# Patient Record
Sex: MALE | Race: WHITE | NOT HISPANIC OR LATINO | Employment: OTHER | URBAN - METROPOLITAN AREA
[De-identification: names, ages, dates, MRNs, and addresses within clinical notes are randomized per-mention and may not be internally consistent; named-entity substitution may affect disease eponyms.]

---

## 2023-09-02 PROBLEM — J31.0 CHRONIC RHINITIS: Status: ACTIVE | Noted: 2023-09-02

## 2023-09-02 PROBLEM — E87.8 FLUID VOLUME DISORDER: Status: ACTIVE | Noted: 2023-09-02

## 2023-09-02 PROBLEM — I10 ESSENTIAL HYPERTENSION: Status: ACTIVE | Noted: 2023-09-02

## 2023-09-02 PROBLEM — R29.898 WEAKNESS OF BOTH LOWER EXTREMITIES: Status: ACTIVE | Noted: 2023-09-02

## 2023-09-02 PROBLEM — G47.30 SLEEP APNEA: Status: ACTIVE | Noted: 2023-09-02

## 2023-09-02 PROBLEM — I73.9 PERIPHERAL VASCULAR DISEASE (CMS-HCC): Status: ACTIVE | Noted: 2023-09-02

## 2023-09-02 PROBLEM — M25.552 PAIN OF LEFT HIP JOINT: Status: ACTIVE | Noted: 2023-09-02

## 2023-09-02 PROBLEM — R10.32 LEFT INGUINAL PAIN: Status: ACTIVE | Noted: 2023-09-02

## 2023-09-02 PROBLEM — D69.6 THROMBOCYTOPENIA (CMS-HCC): Status: ACTIVE | Noted: 2023-09-02

## 2023-09-02 PROBLEM — F09 COGNITIVE DISORDER: Status: ACTIVE | Noted: 2023-09-02

## 2023-09-02 PROBLEM — E78.5 HYPERLIPIDEMIA: Status: ACTIVE | Noted: 2023-09-02

## 2023-09-02 PROBLEM — I65.23 BILATERAL CAROTID ARTERY STENOSIS: Status: ACTIVE | Noted: 2023-09-02

## 2023-09-02 PROBLEM — I25.10 CVD (CARDIOVASCULAR DISEASE): Status: ACTIVE | Noted: 2023-09-02

## 2023-09-02 PROBLEM — R00.1 BRADYCARDIA: Status: ACTIVE | Noted: 2023-09-02

## 2023-09-02 PROBLEM — I49.1 PREMATURE ATRIAL CONTRACTION: Status: ACTIVE | Noted: 2023-09-02

## 2023-09-02 PROBLEM — I82.90 VENOUS THROMBOSIS: Status: ACTIVE | Noted: 2023-09-02

## 2023-09-02 PROBLEM — G89.29 OTHER CHRONIC PAIN: Status: ACTIVE | Noted: 2023-09-02

## 2023-09-02 PROBLEM — I63.9 CEREBROVASCULAR ACCIDENT (MULTI): Status: ACTIVE | Noted: 2023-09-02

## 2023-09-02 PROBLEM — M25.551 PAIN IN RIGHT HIP: Status: ACTIVE | Noted: 2023-09-02

## 2023-09-02 PROBLEM — D69.1 PLATELET DISORDER (MULTI): Status: ACTIVE | Noted: 2023-09-02

## 2023-09-02 PROBLEM — R41.3 MEMORY IMPAIRMENT: Status: ACTIVE | Noted: 2023-09-02

## 2023-09-02 PROBLEM — K40.90 RIGHT INGUINAL HERNIA: Status: ACTIVE | Noted: 2023-09-02

## 2023-09-02 PROBLEM — H93.13 TINNITUS OF BOTH EARS: Status: ACTIVE | Noted: 2023-09-02

## 2023-09-02 PROBLEM — H90.3 SENSORINEURAL HEARING LOSS, BILATERAL: Status: ACTIVE | Noted: 2023-09-02

## 2023-09-02 RX ORDER — LISINOPRIL 5 MG/1
5 TABLET ORAL DAILY
COMMUNITY
Start: 2022-03-28 | End: 2024-02-13 | Stop reason: SINTOL

## 2023-09-02 RX ORDER — CHOLECALCIFEROL (VITAMIN D3) 25 MCG
25 TABLET ORAL DAILY
COMMUNITY

## 2023-09-02 RX ORDER — IPRATROPIUM BROMIDE 42 UG/1
2 SPRAY, METERED NASAL
COMMUNITY
Start: 2021-04-20 | End: 2024-06-06 | Stop reason: ALTCHOICE

## 2023-09-02 RX ORDER — SIMVASTATIN 80 MG/1
80 TABLET, FILM COATED ORAL DAILY
COMMUNITY
Start: 2023-04-21

## 2023-09-02 RX ORDER — HYDROCODONE BITARTRATE AND ACETAMINOPHEN 5; 325 MG/1; MG/1
TABLET ORAL
COMMUNITY

## 2023-09-02 RX ORDER — TRIAMCINOLONE ACETONIDE 1 MG/G
OINTMENT TOPICAL 2 TIMES DAILY
COMMUNITY

## 2023-09-02 RX ORDER — IPRATROPIUM BROMIDE 21 UG/1
SPRAY, METERED NASAL
COMMUNITY

## 2023-09-02 RX ORDER — METOPROLOL TARTRATE 25 MG/1
25 TABLET, FILM COATED ORAL 2 TIMES DAILY
COMMUNITY
Start: 2022-03-28 | End: 2024-04-24 | Stop reason: SINTOL

## 2023-09-02 RX ORDER — FLUTICASONE PROPIONATE 50 MCG
1 SPRAY, SUSPENSION (ML) NASAL DAILY
COMMUNITY
Start: 2020-10-16 | End: 2024-01-23 | Stop reason: ALTCHOICE

## 2023-11-13 ENCOUNTER — APPOINTMENT (OUTPATIENT)
Dept: AUDIOLOGY | Facility: CLINIC | Age: 88
End: 2023-11-13
Payer: MEDICARE

## 2023-11-16 ENCOUNTER — PHARMACY VISIT (OUTPATIENT)
Dept: PHARMACY | Facility: CLINIC | Age: 88
End: 2023-11-16
Payer: COMMERCIAL

## 2023-11-16 PROCEDURE — RXMED WILLOW AMBULATORY MEDICATION CHARGE

## 2023-11-27 ENCOUNTER — CLINICAL SUPPORT (OUTPATIENT)
Dept: AUDIOLOGY | Facility: CLINIC | Age: 88
End: 2023-11-27
Payer: MEDICARE

## 2023-11-27 DIAGNOSIS — H90.3 SENSORINEURAL HEARING LOSS (SNHL) OF BOTH EARS: Primary | ICD-10-CM

## 2023-11-27 PROCEDURE — HRANC PR HEARING AID NO CHARGE: Performed by: AUDIOLOGIST

## 2023-11-27 NOTE — PROGRESS NOTES
HEARING AID CHECK    RIGHT:  HEARING AID CHECK     TODAY : 5/8/23     RIGHT: PHONAK AUDEO B 90 R #2 LENGTH STANDARD  CLOSED MED DOME SN: [id number]  LEFT: PHONAK AUDEO B 90 R #2 LENGTH STANDARD  SMALL POWER DOME SN: [id number]  FIT DATE: 9/2018  WARRANTY: 12/21/21    This patient was seen for a HAC : . Otoscopy showed clear canals, and patient wearing aids 8 hours per day  Could not program as iCube  ll would not connect. Patient was fine as did not want changes.  Discussed new technology if needing new aids .    The following programming changes were made: none today    The patient paid $30.00 for today's visit.  Return in 6 months or sooner if needed.    APPOINTMENT TIME: 30

## 2023-12-07 ENCOUNTER — OFFICE VISIT (OUTPATIENT)
Dept: PRIMARY CARE | Facility: CLINIC | Age: 88
End: 2023-12-07
Payer: MEDICARE

## 2023-12-07 ENCOUNTER — APPOINTMENT (OUTPATIENT)
Dept: CARDIOLOGY | Facility: CLINIC | Age: 88
End: 2023-12-07
Payer: MEDICARE

## 2023-12-07 VITALS
TEMPERATURE: 98.7 F | HEART RATE: 90 BPM | HEIGHT: 68 IN | DIASTOLIC BLOOD PRESSURE: 76 MMHG | OXYGEN SATURATION: 96 % | SYSTOLIC BLOOD PRESSURE: 122 MMHG | BODY MASS INDEX: 28.95 KG/M2 | RESPIRATION RATE: 18 BRPM | WEIGHT: 191 LBS

## 2023-12-07 DIAGNOSIS — N39.0 URINARY TRACT INFECTION WITH HEMATURIA, SITE UNSPECIFIED: ICD-10-CM

## 2023-12-07 DIAGNOSIS — R30.0 DYSURIA: ICD-10-CM

## 2023-12-07 DIAGNOSIS — R31.9 URINARY TRACT INFECTION WITH HEMATURIA, SITE UNSPECIFIED: ICD-10-CM

## 2023-12-07 LAB
POC APPEARANCE, URINE: CLEAR
POC BILIRUBIN, URINE: NEGATIVE
POC BLOOD, URINE: ABNORMAL
POC COLOR, URINE: YELLOW
POC GLUCOSE, URINE: NEGATIVE MG/DL
POC KETONES, URINE: NEGATIVE MG/DL
POC LEUKOCYTES, URINE: ABNORMAL
POC NITRITE,URINE: NEGATIVE
POC PH, URINE: 6 PH
POC PROTEIN, URINE: ABNORMAL MG/DL
POC SPECIFIC GRAVITY, URINE: 1.02
POC UROBILINOGEN, URINE: 1 EU/DL

## 2023-12-07 PROCEDURE — 3078F DIAST BP <80 MM HG: CPT | Performed by: FAMILY MEDICINE

## 2023-12-07 PROCEDURE — 1159F MED LIST DOCD IN RCRD: CPT | Performed by: FAMILY MEDICINE

## 2023-12-07 PROCEDURE — 1036F TOBACCO NON-USER: CPT | Performed by: FAMILY MEDICINE

## 2023-12-07 PROCEDURE — 99213 OFFICE O/P EST LOW 20 MIN: CPT | Performed by: FAMILY MEDICINE

## 2023-12-07 PROCEDURE — 1126F AMNT PAIN NOTED NONE PRSNT: CPT | Performed by: FAMILY MEDICINE

## 2023-12-07 PROCEDURE — 87186 SC STD MICRODIL/AGAR DIL: CPT | Performed by: FAMILY MEDICINE

## 2023-12-07 PROCEDURE — 3074F SYST BP LT 130 MM HG: CPT | Performed by: FAMILY MEDICINE

## 2023-12-07 RX ORDER — METRONIDAZOLE 7.5 MG/G
GEL TOPICAL
COMMUNITY
Start: 2023-10-11

## 2023-12-07 RX ORDER — SULFAMETHOXAZOLE AND TRIMETHOPRIM 800; 160 MG/1; MG/1
1 TABLET ORAL 2 TIMES DAILY
Qty: 14 TABLET | Refills: 0 | Status: SHIPPED | OUTPATIENT
Start: 2023-12-07 | End: 2023-12-14

## 2023-12-07 ASSESSMENT — PATIENT HEALTH QUESTIONNAIRE - PHQ9
SUM OF ALL RESPONSES TO PHQ9 QUESTIONS 1 AND 2: 0
2. FEELING DOWN, DEPRESSED OR HOPELESS: NOT AT ALL
1. LITTLE INTEREST OR PLEASURE IN DOING THINGS: NOT AT ALL

## 2023-12-07 ASSESSMENT — PAIN SCALES - GENERAL: PAINLEVEL: 0-NO PAIN

## 2023-12-07 NOTE — PROGRESS NOTES
"Subjective   Patient ID: Jamie Caro is a 88 y.o. male who presents for UTI (Pt here for pain with urination x5 days).    UTI          Review of Systems    Objective   /76 (BP Location: Left arm, Patient Position: Sitting, BP Cuff Size: Adult)   Pulse 90   Temp 37.1 °C (98.7 °F) (Temporal)   Resp 18   Ht 1.727 m (5' 8\")   Wt 86.6 kg (191 lb)   SpO2 96%   BMI 29.04 kg/m²     Physical Exam  Constitutional:       General: He is not in acute distress.     Appearance: Normal appearance.   Cardiovascular:      Rate and Rhythm: Normal rate and regular rhythm.      Heart sounds: No murmur heard.  Pulmonary:      Breath sounds: Normal breath sounds. No wheezing.   Neurological:      Mental Status: He is alert.         Assessment/Plan   Problem List Items Addressed This Visit             ICD-10-CM    Dysuria R30.0    Urinary tract infection with hematuria N39.0, R31.9          "

## 2023-12-10 LAB — BACTERIA UR CULT: ABNORMAL

## 2023-12-12 ENCOUNTER — OFFICE VISIT (OUTPATIENT)
Dept: CARDIOLOGY | Facility: CLINIC | Age: 88
End: 2023-12-12
Payer: MEDICARE

## 2023-12-12 ENCOUNTER — LAB (OUTPATIENT)
Dept: LAB | Facility: LAB | Age: 88
End: 2023-12-12
Payer: MEDICARE

## 2023-12-12 VITALS — HEART RATE: 76 BPM

## 2023-12-12 DIAGNOSIS — I48.4 ATYPICAL ATRIAL FLUTTER (MULTI): ICD-10-CM

## 2023-12-12 LAB
ALBUMIN SERPL-MCNC: 4.3 G/DL (ref 3.5–5)
ALP BLD-CCNC: 118 U/L (ref 35–125)
ALT SERPL-CCNC: 147 U/L (ref 5–40)
ANION GAP SERPL CALC-SCNC: 14 MMOL/L
AST SERPL-CCNC: 102 U/L (ref 5–40)
BILIRUB SERPL-MCNC: 0.7 MG/DL (ref 0.1–1.2)
BUN SERPL-MCNC: 21 MG/DL (ref 8–25)
CALCIUM SERPL-MCNC: 9.6 MG/DL (ref 8.5–10.4)
CHLORIDE SERPL-SCNC: 110 MMOL/L (ref 97–107)
CO2 SERPL-SCNC: 24 MMOL/L (ref 24–31)
CREAT SERPL-MCNC: 1.1 MG/DL (ref 0.4–1.6)
ERYTHROCYTE [DISTWIDTH] IN BLOOD BY AUTOMATED COUNT: 13.1 % (ref 11.5–14.5)
GFR SERPL CREATININE-BSD FRML MDRD: 65 ML/MIN/1.73M*2
GLUCOSE SERPL-MCNC: 94 MG/DL (ref 65–99)
HCT VFR BLD AUTO: 42.1 % (ref 41–52)
HGB BLD-MCNC: 13.5 G/DL (ref 13.5–17.5)
MCH RBC QN AUTO: 31.9 PG (ref 26–34)
MCHC RBC AUTO-ENTMCNC: 32.1 G/DL (ref 32–36)
MCV RBC AUTO: 100 FL (ref 80–100)
NRBC BLD-RTO: 0 /100 WBCS (ref 0–0)
PLATELET # BLD AUTO: 206 X10*3/UL (ref 150–450)
POTASSIUM SERPL-SCNC: 5 MMOL/L (ref 3.4–5.1)
PROT SERPL-MCNC: 7 G/DL (ref 5.9–7.9)
RBC # BLD AUTO: 4.23 X10*6/UL (ref 4.5–5.9)
SODIUM SERPL-SCNC: 148 MMOL/L (ref 133–145)
WBC # BLD AUTO: 6.4 X10*3/UL (ref 4.4–11.3)

## 2023-12-12 PROCEDURE — 99213 OFFICE O/P EST LOW 20 MIN: CPT | Performed by: INTERNAL MEDICINE

## 2023-12-12 PROCEDURE — 1126F AMNT PAIN NOTED NONE PRSNT: CPT | Performed by: INTERNAL MEDICINE

## 2023-12-12 PROCEDURE — 36415 COLL VENOUS BLD VENIPUNCTURE: CPT

## 2023-12-12 PROCEDURE — 1036F TOBACCO NON-USER: CPT | Performed by: INTERNAL MEDICINE

## 2023-12-12 PROCEDURE — 1159F MED LIST DOCD IN RCRD: CPT | Performed by: INTERNAL MEDICINE

## 2023-12-12 PROCEDURE — 93005 ELECTROCARDIOGRAM TRACING: CPT | Performed by: INTERNAL MEDICINE

## 2023-12-12 PROCEDURE — 80053 COMPREHEN METABOLIC PANEL: CPT

## 2023-12-12 PROCEDURE — 85027 COMPLETE CBC AUTOMATED: CPT

## 2023-12-12 PROCEDURE — 93010 ELECTROCARDIOGRAM REPORT: CPT | Performed by: INTERNAL MEDICINE

## 2023-12-12 NOTE — PROGRESS NOTES
Chief Complaint   Patient presents with    Atrial Flutter            The patient is an 88-year-old male who is well-known to me.  He has a history of hypertension, vascular disease, and permanent atrial arrhythmias with really no significant symptoms associated with these at all.  He is maintained on anticoagulants and rate control and he has done well with this approach.         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 (CMS/[facility]) 09/02/2023    Bilateral carotid artery stenosis 09/02/2023    Bradycardia 09/02/2023    Cerebrovascular accident (CMS/[facility]) 09/02/2023    Chronic rhinitis 09/02/2023    Coronary arteriosclerosis 09/02/2023    Essential hypertension 09/02/2023    Fluid volume disorder 09/02/2023    Hyperlipidemia 09/02/2023    Left inguinal pain 09/02/2023    Right inguinal pain 09/02/2023    Lumbago with sciatica 09/02/2023    Cognitive disorder 09/02/2023    Memory impairment 09/02/2023    Other chronic pain 09/02/2023    CVD (cardiovascular disease) 09/02/2023    Peripheral vascular disease (CMS/[facility]) 09/02/2023    Pain in right hip 09/02/2023    Pain of left hip joint 09/02/2023    Premature atrial contraction 09/02/2023    Premature beats 09/02/2023    Right inguinal hernia 09/02/2023    Sensorineural hearing loss, 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    Sleep apnea 09/02/2023    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[facility]) 09/02/2023    Thrombocytopenia (CMS/[facility]) 09/02/2023    Tinnitus of both ears 09/02/2023    Typical atrial flutter (CMS/[facility]) 09/02/2023    Venous thrombosis 09/02/2023    Weakness of both lower extremities 09/02/2023    Dysuria 12/07/2023    Urinary tract infection with hematuria 12/07/2023     Resolved Ambulatory Problems     Diagnosis Date Noted    No Resolved Ambulatory Problems     Past Medical History:   Diagnosis Date    Atherosclerotic heart disease of native coronary artery without angina pectoris     Personal history of other diseases of the  circulatory system     Personal history of other malignant neoplasm of skin         Review of Systems   All other systems reviewed and are negative.       Objective     Vitals:    12/12/23 1219   Pulse: 76        Constitutional:       Appearance: Healthy appearance.   Pulmonary:      Effort: Pulmonary effort is normal.      Breath sounds: Normal breath sounds.   Cardiovascular:      PMI at left midclavicular line. Tachycardia present. Irregularly irregular rhythm.      Murmurs: There is a systolic murmur.      No gallop.  No click. No rub.   Pulses:     Intact distal pulses.   Abdominal:      General: Bowel sounds are normal.   Musculoskeletal:      Cervical back: Neck supple. Skin:     General: Skin is warm and dry.   Neurological:      General: No focal deficit present.            Lab Review:   Office Visit on 12/07/2023   Component Date Value    POC Color, Urine 12/07/2023 Yellow     POC Appearance, Urine 12/07/2023 Clear     POC Glucose, Urine 12/07/2023 NEGATIVE     POC Bilirubin, Urine 12/07/2023 NEGATIVE     POC Ketones, Urine 12/07/2023 NEGATIVE     POC Specific Gravity, Ur* 12/07/2023 1.020     POC Blood, Urine 12/07/2023 MODERATE (2+) (A)     POC PH, Urine 12/07/2023 6.0     POC Protein, Urine 12/07/2023 100 (2+) (A)     POC Urobilinogen, Urine 12/07/2023 1.0     Poc Nitrite, Urine 12/07/2023 NEGATIVE     POC Leukocytes, Urine 12/07/2023 MODERATE (2+) (A)     Urine Culture 12/07/2023 >100,000 Proteus mirabilis (A)        ECG:    Atypical atrial flutter with variable conduction left axis deviation right bundle branch block QRS duration 140 ms QTc 440 ms    Assessment/plan:  Continue current regimen:

## 2023-12-26 ENCOUNTER — APPOINTMENT (OUTPATIENT)
Dept: CARDIOLOGY | Facility: CLINIC | Age: 88
End: 2023-12-26
Payer: COMMERCIAL

## 2024-01-02 ENCOUNTER — EVALUATION (OUTPATIENT)
Dept: OCCUPATIONAL THERAPY | Facility: CLINIC | Age: 89
End: 2024-01-02
Payer: MEDICARE

## 2024-01-02 DIAGNOSIS — S63.614A UNSPECIFIED SPRAIN OF RIGHT RING FINGER, INITIAL ENCOUNTER: ICD-10-CM

## 2024-01-02 DIAGNOSIS — M79.641 PAIN IN RIGHT HAND: Primary | ICD-10-CM

## 2024-01-02 PROCEDURE — 97110 THERAPEUTIC EXERCISES: CPT | Mod: GO | Performed by: OCCUPATIONAL THERAPIST

## 2024-01-02 PROCEDURE — 97165 OT EVAL LOW COMPLEX 30 MIN: CPT | Mod: GO | Performed by: OCCUPATIONAL THERAPIST

## 2024-01-02 ASSESSMENT — PAIN - FUNCTIONAL ASSESSMENT: PAIN_FUNCTIONAL_ASSESSMENT: 0-10

## 2024-01-02 ASSESSMENT — ENCOUNTER SYMPTOMS
PAIN SCALE AT HIGHEST: 7
PAIN SCALE: 0
PAIN SCALE AT LOWEST: 0
QUALITY: TIGHT

## 2024-01-02 ASSESSMENT — PAIN SCALES - GENERAL: PAINLEVEL_OUTOF10: 7

## 2024-01-09 ENCOUNTER — TREATMENT (OUTPATIENT)
Dept: OCCUPATIONAL THERAPY | Facility: CLINIC | Age: 89
End: 2024-01-09
Payer: MEDICARE

## 2024-01-09 DIAGNOSIS — S63.614A UNSPECIFIED SPRAIN OF RIGHT RING FINGER, INITIAL ENCOUNTER: ICD-10-CM

## 2024-01-09 PROCEDURE — 97110 THERAPEUTIC EXERCISES: CPT | Mod: GO,CO

## 2024-01-09 PROCEDURE — 97022 WHIRLPOOL THERAPY: CPT | Mod: GO,CO

## 2024-01-09 ASSESSMENT — PAIN - FUNCTIONAL ASSESSMENT: PAIN_FUNCTIONAL_ASSESSMENT: 0-10

## 2024-01-09 ASSESSMENT — PAIN SCALES - GENERAL: PAINLEVEL_OUTOF10: 6

## 2024-01-09 NOTE — PROGRESS NOTES
"Occupational Therapy Treatment    Patient Name: Jamie Caro  MRN: [id number]  Today's Date: 1/9/2024    Time Calculation  Start Time: 1040  Stop Time: 1125  Time Calculation (min): 45 min  OT Modalities Time Entry  Whirlpool Time Entry: 10  OT Therapeutic Procedures Time Entry  Manual Therapy Time Entry: 10  Therapeutic Exercise Time Entry: 25  Insurance:  Visit number: 2 of 6  Insurance Type: Medicare part A and B    Subjective   Current Problem/Diagnosis:  1. Pain in right hand          2. Unspecified sprain of right ring finger, initial encounter  Referral to Occupational Therapy     Follow Up In Occupational Therapy               History of Present Illness  Date of onset: 5/4/2023  Mechanism of injury: Patient reports he sustained a fall on 5/4/23 while walking his dog outside; the leash was wrapped around his R hand at the time of the fall. Patient reports onset of edema in R LF and RF and dorsal/volar aspects of hand; shortly after patient began noticing difficulty extending and flexing digits, which has worsened since onset bringing patient to clinic at this time.  Referred by: Maryellen, follow up in Mid Feb    Patient reports \" my fingers are stiff and just wont straighten out\". Pt not currently using modalities, educated pt in importance of heat before and ice after HEP to achieve most outcome. Goals reviewed with pt, he verbalized understanding.     Performing HEP?: Yes    Pain:  Pain Assessment  Pain Assessment: 0-10  Pain Score: 6  Pain Type: Acute pain  Pain Location: Finger (Comment which one)  Pain Orientation: Right (ring finger)  Pain Interventions:  (pt not currently using any modalities or meds)    Objective Min/trace edema noted throughout R RF, flexion contracture in R RF PIP and slightly in R SF  PIP.   Sensory: intact  Numbness/Tingling: none noted today    UEFI completed by pt today at 74/80    AROM measured today , the changes noted are   R IF DIP flexion at 60(was 45)  R LF DIP flexion " at 45 (was 30)  R RF PIP ext at -35( was -55)  R RF DIP flexion at 40 ( was 25)  R SF PIP flexion at 68( was 45)    Treatment:    Modalities:   AROM /tendon glides while in fluido to promote muscle movement during session x 10 min    Therapeutic Exercise:    PROM performed by writer of all joints in R RF and R SF, good tolerance by pt.   Educated pt in place and hold exercises with focus on PIP fleixon and ext.  Pt completed 15 reps(up from 10) of tendon glides  Educated pt to add to HEP curls around pencil, completed 5 reps with 5 sec hold.     Post-tx pain:0/10, unchanged since start of session.   Pt tolerated added exercises well. Pt to increase reps of tendon glides and focus on wearing digit sleeves and use of modalities to decrease edema. Noted increased AROM in many areas of R hand. Pt highly motivated to return to function.     OP EDUCATION:  Education  Individual(s) Educated: Patient  Education Provided: POC discussed and agreed upon, Orthotics and/or prosthetic trainging, Edema control  Home Program: PROM, AROM, Edema control, Orthotic wearing schedule, care and precautions  Patient/Caregiver Demonstrated Understanding: yes  Patient Response to Education: Patient/Caregiver Verbalized Understanding of Information, Patient/Caregiver Performed Return Demonstration of Exercises/Activities, Patient/Caregiver Asked Appropriate Questions    Goals:  Active       OT Goals       1. Patient will increase AROM R hand all digits to at least 220 degrees to increase functional use and ease of gripping with R hand. (Progressing)       Start:  01/02/24    Expected End:  01/16/24            2. Patient will increase PIP extension of R LF and RF by at least 15 degrees to increase functional use of R hand. (Progressing)       Start:  01/02/24    Expected End:  01/16/24            3. Patient will increase R hand  strength by at least 10# to increase functional use of dominant hand with daily activities/hobbies.  (Progressing)       Start:  01/02/24    Expected End:  02/13/24

## 2024-01-10 ENCOUNTER — PHARMACY VISIT (OUTPATIENT)
Dept: PHARMACY | Facility: CLINIC | Age: 89
End: 2024-01-10
Payer: COMMERCIAL

## 2024-01-10 PROCEDURE — RXMED WILLOW AMBULATORY MEDICATION CHARGE

## 2024-01-16 ENCOUNTER — TREATMENT (OUTPATIENT)
Dept: OCCUPATIONAL THERAPY | Facility: CLINIC | Age: 89
End: 2024-01-16
Payer: MEDICARE

## 2024-01-16 DIAGNOSIS — S63.614A UNSPECIFIED SPRAIN OF RIGHT RING FINGER, INITIAL ENCOUNTER: ICD-10-CM

## 2024-01-16 PROCEDURE — 97110 THERAPEUTIC EXERCISES: CPT | Mod: GO | Performed by: OCCUPATIONAL THERAPIST

## 2024-01-16 ASSESSMENT — PAIN - FUNCTIONAL ASSESSMENT: PAIN_FUNCTIONAL_ASSESSMENT: 0-10

## 2024-01-16 ASSESSMENT — PAIN SCALES - GENERAL: PAINLEVEL_OUTOF10: 0 - NO PAIN

## 2024-01-16 NOTE — PROGRESS NOTES
"Occupational Therapy Treatment    Patient Name: Jamie Caro  MRN: [id number]  Today's Date: 1/16/2024    Time Calculation  Start Time: 1015  Stop Time: 1105  Time Calculation (min): 50 min  OT Modalities Time Entry  Whirlpool Time Entry: 10  OT Therapeutic Procedures Time Entry  Therapeutic Exercise Time Entry: 40    Insurance:  Visit number: 3 of 6 (anticipated)  Insurance Type: Medicare    Subjective   Current Problem/Reason for visit:  Patient reports he sustained a fall on 5/4/23 while walking his dog outside; the leash was wrapped around his R hand at the time of the fall. Patient reports onset of edema in R LF and RF and dorsal/volar aspects of hand; shortly after patient began noticing difficulty extending and flexing digits, which has worsened since onset bringing patient to clinic at this time.  Referred by: Maryellen,    Patient reports \"It's a little better.\"    Performing HEP?: Yes    Pain:  Pain Assessment  Pain Assessment: 0-10  Pain Score: 0 - No pain    Objective     Right AROM  INDEX LONG RING SMALL    MCP Extension/Flexion 0/75 0/80 0/85 0/85    PIP Extension/Flexion 0/90 -30/90 -35/90 -20/90    DIP Extension/Flexion 0/50 0/50 0/40 0/60   FERNANDEZ  215 (+5) 190 (+40) 180  (+35) 215  (+15)       Physical Observation: +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    Therapeutic Exercise:  Therapist provided demonstration with verbal instruction for DIP blocking ther ex for R LF and RF; patient performed with hands-on assist x5 reps with 5 second hold; good return demo x10 reps; written handout issued; added to HEP.  Therapist provided demonstration with verbal instruction for Reverse blocking ther ex for R LF and RF; patient performed with hands-on assist x5 reps with 5 second hold; good return " demo x10 reps; written handout issued; added to HEP.  Thenar and hypothenar strengthening with two band resistance and isometric composite and lumbrical gripping; 10 reps each; written handout issued; added to HEP.      Post-tx pain: 0/10    Assessment/Plan Patient with improving AROM noted in all digits this date; good tolerance for ther ex; requires re-instruction to ensure accurate follow through; high motivation throughout tx sx. Will continue advance intervention as indicated/tolerated.        OP EDUCATION:  Education  Individual(s) Educated: Patient  Home Program: PROM, AROM, Tendon gliding, Strengthening, Modalities, Edema control    Goals:  Active       OT Goals       1. Patient will increase AROM R hand all digits to at least 220 degrees to increase functional use and ease of gripping with R hand. (Progressing)       Start:  01/02/24    Expected End:  01/16/24            2. Patient will increase PIP extension of R LF and RF by at least 15 degrees to increase functional use of R hand. (Progressing)       Start:  01/02/24    Expected End:  01/16/24            3. Patient will increase R hand  strength by at least 10# to increase functional use of dominant hand with daily activities/hobbies. (Progressing)       Start:  01/02/24    Expected End:  02/13/24

## 2024-01-23 ENCOUNTER — TREATMENT (OUTPATIENT)
Dept: OCCUPATIONAL THERAPY | Facility: CLINIC | Age: 89
End: 2024-01-23
Payer: MEDICARE

## 2024-01-23 ENCOUNTER — HOSPITAL ENCOUNTER (OUTPATIENT)
Dept: RADIOLOGY | Facility: CLINIC | Age: 89
Discharge: HOME | End: 2024-01-23
Payer: MEDICARE

## 2024-01-23 ENCOUNTER — OFFICE VISIT (OUTPATIENT)
Dept: PRIMARY CARE | Facility: CLINIC | Age: 89
End: 2024-01-23
Payer: MEDICARE

## 2024-01-23 ENCOUNTER — TELEPHONE (OUTPATIENT)
Dept: OTOLARYNGOLOGY | Facility: CLINIC | Age: 89
End: 2024-01-23

## 2024-01-23 VITALS
OXYGEN SATURATION: 99 % | DIASTOLIC BLOOD PRESSURE: 60 MMHG | HEART RATE: 51 BPM | SYSTOLIC BLOOD PRESSURE: 140 MMHG | RESPIRATION RATE: 17 BRPM

## 2024-01-23 DIAGNOSIS — R06.02 SHORTNESS OF BREATH: ICD-10-CM

## 2024-01-23 DIAGNOSIS — J31.0 CHRONIC RHINITIS: Primary | ICD-10-CM

## 2024-01-23 DIAGNOSIS — R06.02 SHORTNESS OF BREATH: Primary | ICD-10-CM

## 2024-01-23 DIAGNOSIS — S63.614A UNSPECIFIED SPRAIN OF RIGHT RING FINGER, INITIAL ENCOUNTER: ICD-10-CM

## 2024-01-23 LAB — BNP SERPL-MCNC: 480 PG/ML (ref 0–99)

## 2024-01-23 PROCEDURE — 83880 ASSAY OF NATRIURETIC PEPTIDE: CPT | Mod: WESLAB | Performed by: NURSE PRACTITIONER

## 2024-01-23 PROCEDURE — 99213 OFFICE O/P EST LOW 20 MIN: CPT | Performed by: NURSE PRACTITIONER

## 2024-01-23 PROCEDURE — 1126F AMNT PAIN NOTED NONE PRSNT: CPT | Performed by: NURSE PRACTITIONER

## 2024-01-23 PROCEDURE — 36415 COLL VENOUS BLD VENIPUNCTURE: CPT | Performed by: NURSE PRACTITIONER

## 2024-01-23 PROCEDURE — 71046 X-RAY EXAM CHEST 2 VIEWS: CPT

## 2024-01-23 PROCEDURE — 1123F ACP DISCUSS/DSCN MKR DOCD: CPT | Performed by: NURSE PRACTITIONER

## 2024-01-23 PROCEDURE — 1158F ADVNC CARE PLAN TLK DOCD: CPT | Performed by: NURSE PRACTITIONER

## 2024-01-23 PROCEDURE — 3078F DIAST BP <80 MM HG: CPT | Performed by: NURSE PRACTITIONER

## 2024-01-23 PROCEDURE — 97110 THERAPEUTIC EXERCISES: CPT | Mod: GO | Performed by: OCCUPATIONAL THERAPIST

## 2024-01-23 PROCEDURE — 1157F ADVNC CARE PLAN IN RCRD: CPT | Performed by: NURSE PRACTITIONER

## 2024-01-23 PROCEDURE — 97022 WHIRLPOOL THERAPY: CPT | Mod: GO | Performed by: OCCUPATIONAL THERAPIST

## 2024-01-23 PROCEDURE — 3077F SYST BP >= 140 MM HG: CPT | Performed by: NURSE PRACTITIONER

## 2024-01-23 PROCEDURE — 1159F MED LIST DOCD IN RCRD: CPT | Performed by: NURSE PRACTITIONER

## 2024-01-23 PROCEDURE — 1036F TOBACCO NON-USER: CPT | Performed by: NURSE PRACTITIONER

## 2024-01-23 RX ORDER — FLUTICASONE PROPIONATE 50 MCG
SPRAY, SUSPENSION (ML) NASAL
Qty: 16 G | Refills: 11 | Status: SHIPPED | OUTPATIENT
Start: 2024-01-23

## 2024-01-23 RX ORDER — FUROSEMIDE 20 MG/1
20 TABLET ORAL DAILY
Qty: 30 TABLET | Refills: 0 | Status: SHIPPED | OUTPATIENT
Start: 2024-01-23 | End: 2024-01-30 | Stop reason: SDUPTHER

## 2024-01-23 ASSESSMENT — PAIN SCALES - GENERAL
PAINLEVEL: 0-NO PAIN
PAINLEVEL_OUTOF10: 0 - NO PAIN

## 2024-01-23 ASSESSMENT — PATIENT HEALTH QUESTIONNAIRE - PHQ9
SUM OF ALL RESPONSES TO PHQ9 QUESTIONS 1 AND 2: 0
1. LITTLE INTEREST OR PLEASURE IN DOING THINGS: NOT AT ALL
2. FEELING DOWN, DEPRESSED OR HOPELESS: NOT AT ALL

## 2024-01-23 ASSESSMENT — PAIN - FUNCTIONAL ASSESSMENT: PAIN_FUNCTIONAL_ASSESSMENT: 0-10

## 2024-01-23 NOTE — PROGRESS NOTES
Subjective   Patient ID: Jamie Caro is a 89 y.o. male who presents for Shortness of Breath (Patient here for SOB, saw Dr. Davey a few weeks ago and everything was okay).Had virus about month ago and had cough and was worsening SOB weatther temp causing increase phyelm bending over to lesh dog and take breath away no     HPI     Review of Systems    Objective   /60 (BP Location: Left arm, Patient Position: Sitting, BP Cuff Size: Adult)   Pulse 51   Resp 17   SpO2 99%     Physical Exam  Vitals reviewed.   Constitutional:       Appearance: Normal appearance.   Cardiovascular:      Rate and Rhythm: Normal rate.      Pulses: Normal pulses.      Heart sounds: Normal heart sounds.   Pulmonary:      Effort: Pulmonary effort is normal.      Breath sounds: Normal breath sounds.   Musculoskeletal:      Cervical back: Normal range of motion.   Skin:     General: Skin is warm.   Neurological:      Mental Status: He is alert.         Assessment/Plan   Problem List Items Addressed This Visit    None  Visit Diagnoses         Codes    Shortness of breath    -  Primary R06.02    Relevant Orders    XR chest 2 views (Completed)    B-type natriuretic peptide          Discussed xray with Dr Martinez treatment for pleural effusion. Recheck 3 days

## 2024-01-23 NOTE — PROGRESS NOTES
"Occupational Therapy Treatment    Patient Name: Jamie Caro  MRN: [id number]  Today's Date: 1/23/2024    Time Calculation  Start Time: 1100  Stop Time: 1145  Time Calculation (min): 45 min  OT Modalities Time Entry  Whirlpool Time Entry: 10  OT Therapeutic Procedures Time Entry  Therapeutic Exercise Time Entry: 35    Insurance:  Visit number: 4 of 6 (anticipated)  Insurance Type: Medicare    Subjective   Current Problem/Reason for visit:  Patient reports he sustained a fall on 5/4/23 while walking his dog outside; the leash was wrapped around his R hand at the time of the fall. Patient reports onset of edema in R LF and RF and dorsal/volar aspects of hand; shortly after patient began noticing difficulty extending and flexing digits, which has worsened since onset bringing patient to clinic at this time.    Referred by: Maryellen    Patient reports \"I forgot about my exercises this past week. I have to start making up for it.\"    Performing HEP?: Partially; reports performing AROM/PROM ther ex, however, \"forgot\" about ther ex added at last tx sx.    Pain:  Pain Assessment  Pain Assessment: 0-10  Pain Score: 0 - No pain    Objective     Right AROM  INDEX LONG RING SMALL    MCP Extension/Flexion 0/85 0/85 0/85 0/85    PIP Extension/Flexion 0/90 -35/95 -45/100 -20/100    DIP Extension/Flexion 0/55 0/45 0/40 0/60   FERNANDEZ  230 (+15) 190 (NC) 180  (NC) 220  (+5)       Hand Strength   (lbs) Right Left   1     2 68# (+7) 74# (-6)   3     4     5         Physical Observation: +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    Therapeutic Exercise:  Therapist implemented PROM to R hand IF-->SF for composite and claw flexion, as well as, composite extension x10 reps each; 10 second hold at end range; patient reports " increased pain in digits with PROM ther ex.  Therapist reviewed thenar/hypothenar and isometric ther ex for previous tx sx to ensure understanding and promote follow through; 10 reps of digital abduction/adduction with two band resistance and composite and lumbrical gripping; good return demo.  Therapist provided demonstration with verbal instruction for hand strengthening with extra soft theraputty integrating composite gripping, 2-pt and 3-pt pinching, and digital extension; 10 reps each; written handout issued; added to HEP.    Post-tx pain: 0/10    Assessment/Plan Improvement noted in R hand  strength and some improved in AROM from last tx sx; will continue to progress to increase  strength and tight gripping abilities for increased functional use of R hand.        OP EDUCATION:  Education  Individual(s) Educated: Patient  Home Program: PROM, AROM, Strengthening, Modalities, Handout issued    Goals:  Active       OT Goals       1. Patient will increase AROM R hand all digits to at least 220 degrees to increase functional use and ease of gripping with R hand. (Progressing)       Start:  01/02/24    Expected End:  01/16/24            2. Patient will increase PIP extension of R LF and RF by at least 15 degrees to increase functional use of R hand. (Progressing)       Start:  01/02/24    Expected End:  01/16/24            3. Patient will increase R hand  strength by at least 10# to increase functional use of dominant hand with daily activities/hobbies. (Progressing)       Start:  01/02/24    Expected End:  02/13/24

## 2024-01-30 ENCOUNTER — HOSPITAL ENCOUNTER (OUTPATIENT)
Dept: RADIOLOGY | Facility: CLINIC | Age: 89
Discharge: HOME | End: 2024-01-30
Payer: MEDICARE

## 2024-01-30 ENCOUNTER — OFFICE VISIT (OUTPATIENT)
Dept: PRIMARY CARE | Facility: CLINIC | Age: 89
End: 2024-01-30
Payer: MEDICARE

## 2024-01-30 ENCOUNTER — TREATMENT (OUTPATIENT)
Dept: OCCUPATIONAL THERAPY | Facility: CLINIC | Age: 89
End: 2024-01-30
Payer: MEDICARE

## 2024-01-30 ENCOUNTER — APPOINTMENT (OUTPATIENT)
Dept: OCCUPATIONAL THERAPY | Facility: CLINIC | Age: 89
End: 2024-01-30
Payer: MEDICARE

## 2024-01-30 VITALS
RESPIRATION RATE: 17 BRPM | OXYGEN SATURATION: 98 % | SYSTOLIC BLOOD PRESSURE: 142 MMHG | DIASTOLIC BLOOD PRESSURE: 60 MMHG | HEART RATE: 82 BPM

## 2024-01-30 DIAGNOSIS — I50.9 ACUTE CONGESTIVE HEART FAILURE, UNSPECIFIED HEART FAILURE TYPE (MULTI): Primary | ICD-10-CM

## 2024-01-30 DIAGNOSIS — S63.614A UNSPECIFIED SPRAIN OF RIGHT RING FINGER, INITIAL ENCOUNTER: ICD-10-CM

## 2024-01-30 DIAGNOSIS — R05.1 ACUTE COUGH: ICD-10-CM

## 2024-01-30 DIAGNOSIS — R06.02 SHORTNESS OF BREATH: ICD-10-CM

## 2024-01-30 DIAGNOSIS — I50.9 ACUTE CONGESTIVE HEART FAILURE, UNSPECIFIED HEART FAILURE TYPE (MULTI): ICD-10-CM

## 2024-01-30 PROCEDURE — 97022 WHIRLPOOL THERAPY: CPT | Mod: GO | Performed by: OCCUPATIONAL THERAPIST

## 2024-01-30 PROCEDURE — L3933 FO W/O JOINTS CF: HCPCS | Performed by: OCCUPATIONAL THERAPIST

## 2024-01-30 PROCEDURE — 99213 OFFICE O/P EST LOW 20 MIN: CPT | Performed by: NURSE PRACTITIONER

## 2024-01-30 PROCEDURE — 71046 X-RAY EXAM CHEST 2 VIEWS: CPT

## 2024-01-30 PROCEDURE — 3077F SYST BP >= 140 MM HG: CPT | Performed by: NURSE PRACTITIONER

## 2024-01-30 PROCEDURE — 1126F AMNT PAIN NOTED NONE PRSNT: CPT | Performed by: NURSE PRACTITIONER

## 2024-01-30 PROCEDURE — 3078F DIAST BP <80 MM HG: CPT | Performed by: NURSE PRACTITIONER

## 2024-01-30 PROCEDURE — 97110 THERAPEUTIC EXERCISES: CPT | Mod: GO | Performed by: OCCUPATIONAL THERAPIST

## 2024-01-30 PROCEDURE — 1159F MED LIST DOCD IN RCRD: CPT | Performed by: NURSE PRACTITIONER

## 2024-01-30 PROCEDURE — 1036F TOBACCO NON-USER: CPT | Performed by: NURSE PRACTITIONER

## 2024-01-30 PROCEDURE — 1157F ADVNC CARE PLAN IN RCRD: CPT | Performed by: NURSE PRACTITIONER

## 2024-01-30 RX ORDER — FUROSEMIDE 20 MG/1
20 TABLET ORAL DAILY
Qty: 30 TABLET | Refills: 0 | Status: SHIPPED | OUTPATIENT
Start: 2024-01-30 | End: 2024-02-13 | Stop reason: SDUPTHER

## 2024-01-30 RX ORDER — BENZONATATE 100 MG/1
100 CAPSULE ORAL 3 TIMES DAILY PRN
Qty: 42 CAPSULE | Refills: 0 | Status: SHIPPED | OUTPATIENT
Start: 2024-01-30 | End: 2024-02-29

## 2024-01-30 RX ORDER — DOXYCYCLINE 100 MG/1
100 CAPSULE ORAL 2 TIMES DAILY
Qty: 20 CAPSULE | Refills: 0 | Status: SHIPPED | OUTPATIENT
Start: 2024-01-30 | End: 2024-02-09

## 2024-01-30 ASSESSMENT — PATIENT HEALTH QUESTIONNAIRE - PHQ9
1. LITTLE INTEREST OR PLEASURE IN DOING THINGS: NOT AT ALL
SUM OF ALL RESPONSES TO PHQ9 QUESTIONS 1 AND 2: 0
2. FEELING DOWN, DEPRESSED OR HOPELESS: NOT AT ALL

## 2024-01-30 ASSESSMENT — PAIN SCALES - GENERAL
PAINLEVEL: 0-NO PAIN
PAINLEVEL_OUTOF10: 0 - NO PAIN

## 2024-01-30 ASSESSMENT — ENCOUNTER SYMPTOMS: SHORTNESS OF BREATH: 1

## 2024-01-30 ASSESSMENT — PAIN - FUNCTIONAL ASSESSMENT: PAIN_FUNCTIONAL_ASSESSMENT: 0-10

## 2024-01-30 NOTE — PROGRESS NOTES
"Occupational Therapy Treatment    Patient Name: Jamie Caro  MRN: [id number]  Today's Date: 1/31/2024    Time Calculation  Start Time: 1350  Stop Time: 1440  Time Calculation (min): 50 min  OT Modalities Time Entry  Whirlpool Time Entry: 10  OT Therapeutic Procedures Time Entry  Therapeutic Exercise Time Entry: 30  Splinting Time Entry: 10  Codes  (x2)    Insurance:  Visit number: 5 of 6 (anticipated)  Insurance Type: Medicare    Subjective   Current Problem/Reason for visit:  Patient reports he sustained a fall on 5/4/23 while walking his dog outside; the leash was wrapped around his R hand at the time of the fall. Patient reports onset of edema in R LF and RF and dorsal/volar aspects of hand; shortly after patient began noticing difficulty extending and flexing digits, which has worsened since onset bringing patient to clinic at this time.    Referred by: Maryellen    Patient reports \"I haven't been able to do as much as I'd like to do of the exercises.\"    Performing HEP?: Partially    Pain:  Pain Assessment  Pain Assessment: 0-10  Pain Score: 0 - No pain    Objective     Physical Observation: +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    Therapeutic Exercise:  Therapist implemented PROM to R hand IF-->SF for composite and claw flexion, as well as, composite extension x10 reps each; 10 second hold at end range; patient reports increased pain in digits with PROM ther ex.  Reviewed HEP; primarily theraputty HEP; provided demonstration with verbal instruction for all ther ex; instructed patient in individual digital flexion and extension ther ex with theraputty; 10 reps; added to HEP.    Splinting:  Therapist fabricated custom digit extension splints to reduce PIP flexion of R LF and RF; instructed " patient in don/doff tech, proper fit, wear recommendations, importance of skin monitoring, and splint care; patient verbalized understanding for all education.       Post-tx pain: 0/10    Assessment/Plan Patient demonstrates good tolerance for all intervention this date; requested focus on extension of digits as patient is please with current ability to flex digits with gripping. Will trial splinting to improve extension and adjust as needed; will continue to progress intervention as tolerated.      OP EDUCATION:  Education  Individual(s) Educated: Patient  Home Program: PROM, AROM, Orthotic wearing schedule, care and precautions, Modalities, Strengthening, Edema control, Fine motor tasks    Goals:  Active       OT Goals       1. Patient will increase AROM R hand all digits to at least 220 degrees to increase functional use and ease of gripping with R hand. (Progressing)       Start:  01/02/24    Expected End:  01/16/24            2. Patient will increase PIP extension of R LF and RF by at least 15 degrees to increase functional use of R hand. (Progressing)       Start:  01/02/24    Expected End:  01/16/24            3. Patient will increase R hand  strength by at least 10# to increase functional use of dominant hand with daily activities/hobbies. (Progressing)       Start:  01/02/24    Expected End:  02/13/24

## 2024-01-30 NOTE — PROGRESS NOTES
Subjective   Patient ID: Jamie Caro is a 89 y.o. male who presents for Follow-up (Patient here for follow up for cough, and possibly chest xray).  Taking lasix noticed improvement with breathing cough slight worse at night. Has appt April with Dr Gavin. Will change referral and add echo  HPI     Review of Systems   Respiratory:  Positive for shortness of breath.    All other systems reviewed and are negative.      Objective   There were no vitals taken for this visit.    Physical Exam    Assessment/Plan   Problem List Items Addressed This Visit    None  Visit Diagnoses         Codes    Acute congestive heart failure, unspecified heart failure type (CMS/[facility])    -  Primary I50.9    Relevant Orders    XR chest 2 views    Echocardiogram - Onbase Scan    Referral to Cardiology    Shortness of breath     R06.02    Relevant Medications    furosemide (Lasix) 20 mg tablet    Other Relevant Orders    XR chest 2 views    Echocardiogram - Onbase Scan    Referral to Cardiology    Acute cough     R05.1    Relevant Medications    benzonatate (Tessalon) 100 mg capsule    Other Relevant Orders    XR chest 2 views

## 2024-02-06 ENCOUNTER — APPOINTMENT (OUTPATIENT)
Dept: OCCUPATIONAL THERAPY | Facility: CLINIC | Age: 89
End: 2024-02-06
Payer: MEDICARE

## 2024-02-06 ENCOUNTER — TREATMENT (OUTPATIENT)
Dept: OCCUPATIONAL THERAPY | Facility: CLINIC | Age: 89
End: 2024-02-06
Payer: MEDICARE

## 2024-02-06 DIAGNOSIS — S63.614A UNSPECIFIED SPRAIN OF RIGHT RING FINGER, INITIAL ENCOUNTER: ICD-10-CM

## 2024-02-06 PROCEDURE — 97110 THERAPEUTIC EXERCISES: CPT | Mod: GO | Performed by: OCCUPATIONAL THERAPIST

## 2024-02-06 ASSESSMENT — PAIN SCALES - GENERAL: PAINLEVEL_OUTOF10: 0 - NO PAIN

## 2024-02-06 ASSESSMENT — PAIN - FUNCTIONAL ASSESSMENT: PAIN_FUNCTIONAL_ASSESSMENT: 0-10

## 2024-02-06 NOTE — PROGRESS NOTES
"Occupational Therapy Treatment    Patient Name: Jamie Caro  MRN: [id number]  Today's Date: 2/6/2024    Time Calculation  Start Time: 1100  Stop Time: 1148  Time Calculation (min): 48 min  OT Modalities Time Entry  Whirlpool Time Entry: 10  OT Therapeutic Procedures Time Entry  Therapeutic Exercise Time Entry: 38    Insurance:  Visit number: 6 of 6 (anticipated)  Insurance Type: Medicare    Subjective   Current Problem/Reason for visit:  Patient reports he sustained a fall on 5/4/23 while walking his dog outside; the leash was wrapped around his R hand at the time of the fall. Patient reports onset of edema in R LF and RF and dorsal/volar aspects of hand; shortly after patient began noticing difficulty extending and flexing digits, which has worsened since onset bringing patient to clinic at this time.    Referred by: Maryellen    Patient reports \"I had shortness of breath all last week so I really didn't get to do as much exercise as I  had hoped.\"    Performing HEP?: Partially; patient reports wearing digit extension splints for approximately 5 hours total since provided.    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    Therapeutic Exercise:  Therapist educated patient on importance of prioritizing HEP with daily activity and maintaining consistency; patient verbalized understanding.  Minor adjustments made to splints to increase comfort and   Therapist educated patient on purpose of/benefits of BTE machine for functional strengthening; tools used this date include:  Gripper (resist 85)  Lg knob CW/CCW (resist 9)  MD SD FW/BW (resist 16)  All tools x 2 charts    Post-tx pain:    Assessment/Plan " Patient demonstrates good tolerance for all intervention this date; receptive to education; will complete reassessment at next follow-up.      OP EDUCATION:  Education  Individual(s) Educated: Patient  Home Program: PROM, AROM, Fine motor tasks, Edema control, Modalities, Orthotic wearing schedule, care and precautions, Strengthening, Tendon gliding, Handout issued    Goals:  Active       OT Goals       1. Patient will increase AROM R hand all digits to at least 220 degrees to increase functional use and ease of gripping with R hand. (Progressing)       Start:  01/02/24    Expected End:  01/16/24            2. Patient will increase PIP extension of R LF and RF by at least 15 degrees to increase functional use of R hand. (Progressing)       Start:  01/02/24    Expected End:  01/16/24            3. Patient will increase R hand  strength by at least 10# to increase functional use of dominant hand with daily activities/hobbies. (Progressing)       Start:  01/02/24    Expected End:  02/13/24

## 2024-02-13 ENCOUNTER — OFFICE VISIT (OUTPATIENT)
Dept: PRIMARY CARE | Facility: CLINIC | Age: 89
End: 2024-02-13
Payer: MEDICARE

## 2024-02-13 ENCOUNTER — PHARMACY VISIT (OUTPATIENT)
Dept: PHARMACY | Facility: CLINIC | Age: 89
End: 2024-02-13
Payer: COMMERCIAL

## 2024-02-13 ENCOUNTER — HOSPITAL ENCOUNTER (OUTPATIENT)
Dept: RADIOLOGY | Facility: CLINIC | Age: 89
Discharge: HOME | End: 2024-02-13
Payer: MEDICARE

## 2024-02-13 ENCOUNTER — TREATMENT (OUTPATIENT)
Dept: OCCUPATIONAL THERAPY | Facility: CLINIC | Age: 89
End: 2024-02-13
Payer: MEDICARE

## 2024-02-13 VITALS
SYSTOLIC BLOOD PRESSURE: 120 MMHG | HEART RATE: 78 BPM | BODY MASS INDEX: 29.04 KG/M2 | WEIGHT: 191 LBS | OXYGEN SATURATION: 98 % | RESPIRATION RATE: 17 BRPM | DIASTOLIC BLOOD PRESSURE: 60 MMHG

## 2024-02-13 DIAGNOSIS — S63.614A UNSPECIFIED SPRAIN OF RIGHT RING FINGER, INITIAL ENCOUNTER: ICD-10-CM

## 2024-02-13 DIAGNOSIS — I50.9 ACUTE CONGESTIVE HEART FAILURE, UNSPECIFIED HEART FAILURE TYPE (MULTI): Primary | ICD-10-CM

## 2024-02-13 DIAGNOSIS — R06.02 SHORTNESS OF BREATH: ICD-10-CM

## 2024-02-13 DIAGNOSIS — I10 ESSENTIAL HYPERTENSION: ICD-10-CM

## 2024-02-13 DIAGNOSIS — I50.9 ACUTE CONGESTIVE HEART FAILURE, UNSPECIFIED HEART FAILURE TYPE (MULTI): ICD-10-CM

## 2024-02-13 DIAGNOSIS — R05.1 ACUTE COUGH: ICD-10-CM

## 2024-02-13 PROCEDURE — 83880 ASSAY OF NATRIURETIC PEPTIDE: CPT | Mod: WESLAB | Performed by: NURSE PRACTITIONER

## 2024-02-13 PROCEDURE — 71046 X-RAY EXAM CHEST 2 VIEWS: CPT

## 2024-02-13 PROCEDURE — 99213 OFFICE O/P EST LOW 20 MIN: CPT | Performed by: NURSE PRACTITIONER

## 2024-02-13 PROCEDURE — 1160F RVW MEDS BY RX/DR IN RCRD: CPT | Performed by: NURSE PRACTITIONER

## 2024-02-13 PROCEDURE — 36415 COLL VENOUS BLD VENIPUNCTURE: CPT | Performed by: NURSE PRACTITIONER

## 2024-02-13 PROCEDURE — 3078F DIAST BP <80 MM HG: CPT | Performed by: NURSE PRACTITIONER

## 2024-02-13 PROCEDURE — 3074F SYST BP LT 130 MM HG: CPT | Performed by: NURSE PRACTITIONER

## 2024-02-13 PROCEDURE — 1126F AMNT PAIN NOTED NONE PRSNT: CPT | Performed by: NURSE PRACTITIONER

## 2024-02-13 PROCEDURE — 1157F ADVNC CARE PLAN IN RCRD: CPT | Performed by: NURSE PRACTITIONER

## 2024-02-13 PROCEDURE — 1159F MED LIST DOCD IN RCRD: CPT | Performed by: NURSE PRACTITIONER

## 2024-02-13 PROCEDURE — 97022 WHIRLPOOL THERAPY: CPT | Mod: GO | Performed by: OCCUPATIONAL THERAPIST

## 2024-02-13 PROCEDURE — RXMED WILLOW AMBULATORY MEDICATION CHARGE

## 2024-02-13 PROCEDURE — 97110 THERAPEUTIC EXERCISES: CPT | Mod: GO | Performed by: OCCUPATIONAL THERAPIST

## 2024-02-13 PROCEDURE — 1036F TOBACCO NON-USER: CPT | Performed by: NURSE PRACTITIONER

## 2024-02-13 RX ORDER — FUROSEMIDE 20 MG/1
20 TABLET ORAL DAILY
Qty: 30 TABLET | Refills: 6 | Status: SHIPPED | OUTPATIENT
Start: 2024-02-13 | End: 2024-04-09 | Stop reason: SDUPTHER

## 2024-02-13 RX ORDER — LOSARTAN POTASSIUM 25 MG/1
25 TABLET ORAL DAILY
Qty: 30 TABLET | Refills: 6 | Status: SHIPPED | OUTPATIENT
Start: 2024-02-13 | End: 2025-02-12

## 2024-02-13 ASSESSMENT — PAIN - FUNCTIONAL ASSESSMENT: PAIN_FUNCTIONAL_ASSESSMENT: 0-10

## 2024-02-13 ASSESSMENT — PAIN SCALES - GENERAL
PAINLEVEL: 0-NO PAIN
PAINLEVEL_OUTOF10: 0 - NO PAIN

## 2024-02-13 ASSESSMENT — ENCOUNTER SYMPTOMS
COUGH: 1
SHORTNESS OF BREATH: 1

## 2024-02-13 NOTE — PROGRESS NOTES
"Occupational Therapy Treatment    Patient Name: Jamie Caro  MRN: [id number]  Today's Date: 2/13/2024    Time Calculation  Start Time: 1100  Stop Time: 1146  Time Calculation (min): 46 min  OT Modalities Time Entry  Whirlpool Time Entry: 10  OT Therapeutic Procedures Time Entry  Therapeutic Exercise Time Entry: 36    Insurance:  Visit number: 7 of 6 (anticipated); [place]  Insurance Type: Medicare    Subjective   Current Problem/Reason for visit:  Patient reports he sustained a fall on 5/4/23 while walking his dog outside; the leash was wrapped around his R hand at the time of the fall. Patient reports onset of edema in R LF and RF and dorsal/volar aspects of hand; shortly after patient began noticing difficulty extending and flexing digits, which has worsened since onset bringing patient to clinic at this time.    Referred by: Maryellen    Patient reports \"I've had another busy week so I really haven't done much of the exercises. I've worked with the putty some and done some stretching, but that's about it.\"    Performing HEP?: Partially; \"I've only been doing a little bit.\"    Pain:  Pain Assessment  Pain Assessment: 0-10  Pain Score: 0 - No pain    Objective     UEFI= 65/80    Right AROM  INDEX LONG RING SMALL    MCP Extension/Flexion 0/90 0/90 0/85 0/85    PIP Extension/Flexion 0/95 -25/95 -30/95 -20/95    DIP Extension/Flexion 0/55 0/50 0/35 0/45   FERNANDEZ  240   (+30 from eval) 210   (+60 from eval) 185 (+40 from eval) 205  (+15 from eval)       Hand Strength   (lbs) Right Left   1     2 70 (+9 from eval) 74#    3     4     5         Physical Observation: +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    Therapeutic Exercise:  Reviewed HEP; all questions/concerns answered/addressed. Recommendations made for " commitment to program and continued performance.  Therapist implemented PROM composite extension R LF, RF, and SF x10 reps each with prolonged hold at end range.  Reassessment this date; results compared to initial evaluation and discussed with patient.  Minor adjustments made to extension splints to increase comfort and encourage use/wear with HEP.    Post-tx pain: 0/10    Assessment/Plan Patient has made much progress in ROM and strength since SOC; goals not fully achieved at this time, however, patient appears to have reached maximum potential/benefit with skilled services at this time. HEP carryover limited and patient encouraged to follow through, as well as, resume previously abandoned functional tasks following injury.      OP EDUCATION:  Education  Individual(s) Educated: Patient  Home Program: AROM, PROM, Modalities, Strengthening, Orthotic wearing schedule, care and precautions    Goals:  Active       OT Goals       1. Patient will increase AROM R hand all digits to at least 220 degrees to increase functional use and ease of gripping with R hand. (Progressing)       Start:  01/02/24    Expected End:  01/16/24            2. Patient will increase PIP extension of R LF and RF by at least 15 degrees to increase functional use of R hand. (Progressing)       Start:  01/02/24    Expected End:  01/16/24            3. Patient will increase R hand  strength by at least 10# to increase functional use of dominant hand with daily activities/hobbies. (Progressing)       Start:  01/02/24    Expected End:  02/13/24

## 2024-02-13 NOTE — PROGRESS NOTES
Subjective   Patient ID: Jamie Caro is a 89 y.o. male who presents for Follow-up (Patient here for follow up, also needs Lasix refill).  Still gets mild cough occasionally with activity   HPI on lisinopril will stop to see if any improvement, repeat labs and Xray if not improvement 2 more weeks FU with cardiology     Review of Systems   Respiratory:  Positive for cough and shortness of breath.    All other systems reviewed and are negative.      Objective   /60 (BP Location: Left arm, Patient Position: Sitting, BP Cuff Size: Adult)   Pulse 78   Resp 17   Wt 86.6 kg (191 lb)   SpO2 98%   BMI 29.04 kg/m²     Physical Exam  Constitutional:       General: He is not in acute distress.     Appearance: Normal appearance.   Cardiovascular:      Rate and Rhythm: Normal rate and regular rhythm.      Heart sounds: No murmur heard.  Pulmonary:      Breath sounds: Normal breath sounds. No wheezing.   Neurological:      Mental Status: He is alert.         Assessment/Plan

## 2024-02-13 NOTE — PROGRESS NOTES
Occupational Therapy Discharge [facility]    Patient Name: Jamie Caro  MRN: [id number]  Today's Date: 2/13/2024    Subjective   Current Problem:  Patient reports he sustained a fall on 5/4/23 while walking his dog outside; the leash was wrapped around his R hand at the time of the fall. Patient reports onset of edema in R LF and RF and dorsal/volar aspects of hand; shortly after patient began noticing difficulty extending and flexing digits, which has worsened since onset bringing patient to clinic at this time.    Pain:  Pain Assessment  Pain Assessment: 0-10  Pain Score: 0 - No pain    Objective   Interventions: Ther ex, ther act, neuromuscular re-education, splinting     ADL Assessment: UEFI= 65/80       Extremity Assessments:     Right AROM  INDEX LONG RING SMALL    MCP Extension/Flexion 0/90 0/90 0/85 0/85    PIP Extension/Flexion 0/95 -25/95 -30/95 -20/95    DIP Extension/Flexion 0/55 0/50 0/35 0/45   FERNANDEZ  240   (+30 from eval) 210   (+60 from eval) 185 (+40 from eval) 205  (+15 from eval)       Hand Strength   (lbs) Right Left   1     2 70 (+9 from eval) 74#    3     4     5         Assessment/Plan Patient has made much progress in ROM and strength since SOC; goals not fully achieved at this time, however, patient appears to have reached maximum potential/benefit with skilled services at this time. HEP carryover limited and patient encouraged to follow through, as well as, resume previously abandoned functional tasks following injury.          Goals:  Active       OT Goals       1. Patient will increase AROM R hand all digits to at least 220 degrees to increase functional use and ease of gripping with R hand. (Progressing)       Start:  01/02/24    Expected End:  01/16/24            2. Patient will increase PIP extension of R LF and RF by at least 15 degrees to increase functional use of R hand. (Progressing)       Start:  01/02/24    Expected End:  01/16/24            3. Patient will increase R hand   strength by at least 10# to increase functional use of dominant hand with daily activities/hobbies. (Progressing)       Start:  01/02/24    Expected End:  02/13/24

## 2024-02-14 LAB — BNP SERPL-MCNC: 387 PG/ML (ref 0–99)

## 2024-03-26 ENCOUNTER — PHARMACY VISIT (OUTPATIENT)
Dept: PHARMACY | Facility: CLINIC | Age: 89
End: 2024-03-26
Payer: COMMERCIAL

## 2024-03-26 PROCEDURE — RXMED WILLOW AMBULATORY MEDICATION CHARGE

## 2024-04-09 ENCOUNTER — HOSPITAL ENCOUNTER (OUTPATIENT)
Dept: RADIOLOGY | Facility: CLINIC | Age: 89
Discharge: HOME | End: 2024-04-09
Payer: MEDICARE

## 2024-04-09 ENCOUNTER — OFFICE VISIT (OUTPATIENT)
Dept: PRIMARY CARE | Facility: CLINIC | Age: 89
End: 2024-04-09
Payer: MEDICARE

## 2024-04-09 VITALS
BODY MASS INDEX: 29.04 KG/M2 | HEART RATE: 47 BPM | OXYGEN SATURATION: 99 % | RESPIRATION RATE: 18 BRPM | DIASTOLIC BLOOD PRESSURE: 60 MMHG | WEIGHT: 191 LBS | TEMPERATURE: 97.8 F | SYSTOLIC BLOOD PRESSURE: 130 MMHG

## 2024-04-09 DIAGNOSIS — R06.2 WHEEZING: ICD-10-CM

## 2024-04-09 DIAGNOSIS — I50.9 CONGESTIVE HEART FAILURE, NYHA CLASS 1, UNSPECIFIED CONGESTIVE HEART FAILURE TYPE (MULTI): ICD-10-CM

## 2024-04-09 DIAGNOSIS — I50.9 CONGESTIVE HEART FAILURE, NYHA CLASS 1, UNSPECIFIED CONGESTIVE HEART FAILURE TYPE (MULTI): Primary | ICD-10-CM

## 2024-04-09 DIAGNOSIS — R06.02 SHORTNESS OF BREATH: ICD-10-CM

## 2024-04-09 PROCEDURE — 71046 X-RAY EXAM CHEST 2 VIEWS: CPT | Performed by: RADIOLOGY

## 2024-04-09 PROCEDURE — 71046 X-RAY EXAM CHEST 2 VIEWS: CPT

## 2024-04-09 PROCEDURE — 1157F ADVNC CARE PLAN IN RCRD: CPT | Performed by: NURSE PRACTITIONER

## 2024-04-09 PROCEDURE — 1160F RVW MEDS BY RX/DR IN RCRD: CPT | Performed by: NURSE PRACTITIONER

## 2024-04-09 PROCEDURE — 36415 COLL VENOUS BLD VENIPUNCTURE: CPT | Performed by: NURSE PRACTITIONER

## 2024-04-09 PROCEDURE — 1159F MED LIST DOCD IN RCRD: CPT | Performed by: NURSE PRACTITIONER

## 2024-04-09 PROCEDURE — 1126F AMNT PAIN NOTED NONE PRSNT: CPT | Performed by: NURSE PRACTITIONER

## 2024-04-09 PROCEDURE — 99213 OFFICE O/P EST LOW 20 MIN: CPT | Performed by: NURSE PRACTITIONER

## 2024-04-09 PROCEDURE — 83880 ASSAY OF NATRIURETIC PEPTIDE: CPT | Mod: WESLAB | Performed by: NURSE PRACTITIONER

## 2024-04-09 PROCEDURE — 3075F SYST BP GE 130 - 139MM HG: CPT | Performed by: NURSE PRACTITIONER

## 2024-04-09 PROCEDURE — 3078F DIAST BP <80 MM HG: CPT | Performed by: NURSE PRACTITIONER

## 2024-04-09 PROCEDURE — 1036F TOBACCO NON-USER: CPT | Performed by: NURSE PRACTITIONER

## 2024-04-09 RX ORDER — ALBUTEROL SULFATE 90 UG/1
2 AEROSOL, METERED RESPIRATORY (INHALATION) EVERY 4 HOURS PRN
Qty: 8 G | Refills: 11 | Status: SHIPPED | OUTPATIENT
Start: 2024-04-09 | End: 2025-04-09

## 2024-04-09 RX ORDER — FUROSEMIDE 20 MG/1
20 TABLET ORAL DAILY
Qty: 30 TABLET | Refills: 6 | Status: SHIPPED | OUTPATIENT
Start: 2024-04-09 | End: 2025-04-09

## 2024-04-09 ASSESSMENT — ENCOUNTER SYMPTOMS
DEPRESSION: 0
OCCASIONAL FEELINGS OF UNSTEADINESS: 0
LOSS OF SENSATION IN FEET: 0

## 2024-04-09 ASSESSMENT — PATIENT HEALTH QUESTIONNAIRE - PHQ9
1. LITTLE INTEREST OR PLEASURE IN DOING THINGS: NOT AT ALL
2. FEELING DOWN, DEPRESSED OR HOPELESS: NOT AT ALL
SUM OF ALL RESPONSES TO PHQ9 QUESTIONS 1 AND 2: 0

## 2024-04-09 ASSESSMENT — PAIN SCALES - GENERAL: PAINLEVEL: 0-NO PAIN

## 2024-04-09 NOTE — PROGRESS NOTES
Subjective   Patient ID: Jamie Caro is a 89 y.o. male who presents for Cough (Pt states that his cough has come back and sometimes get's mucus up with no color.).  Not taking any lasix , noticed increase leg   HPI shortness of breath, no fevers cough lying down in bed dose not use oxygen, had inhaler     Review of Systems    Objective   /60   Pulse (!) 47   Temp 36.6 °C (97.8 °F)   Resp 18   Wt 86.6 kg (191 lb)   SpO2 99%   BMI 29.04 kg/m²     Physical Exam  Constitutional:       General: He is not in acute distress.     Appearance: Normal appearance.   Cardiovascular:      Rate and Rhythm: Normal rate and regular rhythm.      Heart sounds: No murmur heard.  Pulmonary:      Breath sounds: Rales present. No wheezing.   Musculoskeletal:         General: Normal range of motion.      Right lower leg: Edema (2+) present.      Left lower leg: Edema (2+) present.   Skin:     General: Skin is warm.   Neurological:      Mental Status: He is alert.         Assessment/Plan   Problem List Items Addressed This Visit    None  Visit Diagnoses         Codes    Congestive heart failure, NYHA class 1, unspecified congestive heart failure type (CMS/[facility])    -  Primary I50.9    Relevant Medications    furosemide (Lasix) 20 mg tablet    Other Relevant Orders    B-type natriuretic peptide    XR chest 2 views    Shortness of breath     R06.02    Relevant Medications    furosemide (Lasix) 20 mg tablet    albuterol (Ventolin HFA) 90 mcg/actuation inhaler    Wheezing     R06.2    Relevant Medications    albuterol (Ventolin HFA) 90 mcg/actuation inhaler          FOLLOW UP ONE WEEK

## 2024-04-10 LAB — BNP SERPL-MCNC: 434 PG/ML (ref 0–99)

## 2024-04-11 ENCOUNTER — TELEPHONE (OUTPATIENT)
Dept: PRIMARY CARE | Facility: CLINIC | Age: 89
End: 2024-04-11
Payer: MEDICARE

## 2024-04-11 DIAGNOSIS — I50.9 PLEURAL EFFUSION DUE TO CHF (CONGESTIVE HEART FAILURE) (MULTI): Primary | ICD-10-CM

## 2024-04-11 DIAGNOSIS — J90 PLEURAL EFFUSION: ICD-10-CM

## 2024-04-11 NOTE — TELEPHONE ENCOUNTER
----- Message from FILI Paige-CNP sent at 4/11/2024  9:06 AM EDT -----  New right pleural effusion. On Lasix for CHF , recommend continue Lasix. Refer to pulmonology may need fluid drained Dr Anton urgent referral placed

## 2024-04-16 ENCOUNTER — HOSPITAL ENCOUNTER (OUTPATIENT)
Dept: RADIOLOGY | Facility: CLINIC | Age: 89
Discharge: HOME | End: 2024-04-16
Payer: MEDICARE

## 2024-04-16 ENCOUNTER — OFFICE VISIT (OUTPATIENT)
Dept: PRIMARY CARE | Facility: CLINIC | Age: 89
End: 2024-04-16
Payer: MEDICARE

## 2024-04-16 VITALS
SYSTOLIC BLOOD PRESSURE: 118 MMHG | TEMPERATURE: 97.6 F | BODY MASS INDEX: 28.95 KG/M2 | HEART RATE: 67 BPM | RESPIRATION RATE: 18 BRPM | HEIGHT: 68 IN | WEIGHT: 191 LBS | DIASTOLIC BLOOD PRESSURE: 62 MMHG | OXYGEN SATURATION: 99 %

## 2024-04-16 DIAGNOSIS — J90 PLEURAL EFFUSION: Primary | ICD-10-CM

## 2024-04-16 DIAGNOSIS — J90 PLEURAL EFFUSION: ICD-10-CM

## 2024-04-16 DIAGNOSIS — I50.9 CONGESTIVE HEART FAILURE, NYHA CLASS 1, UNSPECIFIED CONGESTIVE HEART FAILURE TYPE (MULTI): ICD-10-CM

## 2024-04-16 PROCEDURE — 1159F MED LIST DOCD IN RCRD: CPT | Performed by: NURSE PRACTITIONER

## 2024-04-16 PROCEDURE — G2211 COMPLEX E/M VISIT ADD ON: HCPCS | Performed by: NURSE PRACTITIONER

## 2024-04-16 PROCEDURE — 1157F ADVNC CARE PLAN IN RCRD: CPT | Performed by: NURSE PRACTITIONER

## 2024-04-16 PROCEDURE — 71046 X-RAY EXAM CHEST 2 VIEWS: CPT

## 2024-04-16 PROCEDURE — 1126F AMNT PAIN NOTED NONE PRSNT: CPT | Performed by: NURSE PRACTITIONER

## 2024-04-16 PROCEDURE — 1036F TOBACCO NON-USER: CPT | Performed by: NURSE PRACTITIONER

## 2024-04-16 PROCEDURE — 3078F DIAST BP <80 MM HG: CPT | Performed by: NURSE PRACTITIONER

## 2024-04-16 PROCEDURE — 71046 X-RAY EXAM CHEST 2 VIEWS: CPT | Performed by: RADIOLOGY

## 2024-04-16 PROCEDURE — 1160F RVW MEDS BY RX/DR IN RCRD: CPT | Performed by: NURSE PRACTITIONER

## 2024-04-16 PROCEDURE — 99213 OFFICE O/P EST LOW 20 MIN: CPT | Performed by: NURSE PRACTITIONER

## 2024-04-16 PROCEDURE — 3074F SYST BP LT 130 MM HG: CPT | Performed by: NURSE PRACTITIONER

## 2024-04-16 PROCEDURE — 99213 OFFICE O/P EST LOW 20 MIN: CPT | Mod: 25 | Performed by: NURSE PRACTITIONER

## 2024-04-16 ASSESSMENT — ENCOUNTER SYMPTOMS
LOSS OF SENSATION IN FEET: 0
OCCASIONAL FEELINGS OF UNSTEADINESS: 0
DEPRESSION: 0

## 2024-04-16 ASSESSMENT — PAIN SCALES - GENERAL: PAINLEVEL: 0-NO PAIN

## 2024-04-16 NOTE — PROGRESS NOTES
"Subjective   Patient ID: Jamie Caro is a 89 y.o. male who presents for Follow-up (Patient states that he is here for follow up from his coughing which is better but still on and off. ).  Follow up for CHF and leg edema and SOB and pleural effusion ,seen yesterday by [facility] pulmonologist and has been monitoring daily weight [id number] and now 172# needs xray today and FU Dr Matos discussed walking to check pulse   HPI Has decrease Lasix from twice daily and not down to once daily and trying to do exercise     Review of Systems    Objective   /62   Pulse 67   Temp 36.4 °C (97.6 °F)   Resp 18   Ht 1.727 m (5' 8\")   Wt 86.6 kg (191 lb)   SpO2 99%   BMI 29.04 kg/m²     Physical Exam  Vitals reviewed.   Constitutional:       General: He is not in acute distress.     Appearance: Normal appearance.   Cardiovascular:      Rate and Rhythm: Normal rate and regular rhythm.      Heart sounds: No murmur heard.  Pulmonary:      Breath sounds: Normal breath sounds. No wheezing.   Musculoskeletal:      Comments: Lower leg edema improving    Neurological:      Mental Status: He is alert.         Assessment/Plan          "

## 2024-04-22 ENCOUNTER — APPOINTMENT (OUTPATIENT)
Dept: PRIMARY CARE | Facility: CLINIC | Age: 89
End: 2024-04-22
Payer: MEDICARE

## 2024-04-24 ENCOUNTER — OFFICE VISIT (OUTPATIENT)
Dept: CARDIOLOGY | Facility: CLINIC | Age: 89
End: 2024-04-24
Payer: MEDICARE

## 2024-04-24 VITALS
WEIGHT: 170 LBS | SYSTOLIC BLOOD PRESSURE: 128 MMHG | BODY MASS INDEX: 25.85 KG/M2 | HEART RATE: 40 BPM | OXYGEN SATURATION: 95 % | DIASTOLIC BLOOD PRESSURE: 60 MMHG

## 2024-04-24 DIAGNOSIS — I10 ESSENTIAL HYPERTENSION: Primary | ICD-10-CM

## 2024-04-24 DIAGNOSIS — I65.23 BILATERAL CAROTID ARTERY STENOSIS: ICD-10-CM

## 2024-04-24 DIAGNOSIS — I48.3 TYPICAL ATRIAL FLUTTER (MULTI): ICD-10-CM

## 2024-04-24 DIAGNOSIS — R00.1 BRADYCARDIA: ICD-10-CM

## 2024-04-24 DIAGNOSIS — I50.9 ACUTE CONGESTIVE HEART FAILURE, UNSPECIFIED HEART FAILURE TYPE (MULTI): ICD-10-CM

## 2024-04-24 DIAGNOSIS — R06.02 SHORTNESS OF BREATH: ICD-10-CM

## 2024-04-24 DIAGNOSIS — I50.33 ACUTE ON CHRONIC DIASTOLIC HEART FAILURE (MULTI): ICD-10-CM

## 2024-04-24 DIAGNOSIS — I25.10 CORONARY ARTERIOSCLEROSIS: ICD-10-CM

## 2024-04-24 PROCEDURE — 1157F ADVNC CARE PLAN IN RCRD: CPT | Performed by: INTERNAL MEDICINE

## 2024-04-24 PROCEDURE — 3074F SYST BP LT 130 MM HG: CPT | Performed by: INTERNAL MEDICINE

## 2024-04-24 PROCEDURE — 1160F RVW MEDS BY RX/DR IN RCRD: CPT | Performed by: INTERNAL MEDICINE

## 2024-04-24 PROCEDURE — 1159F MED LIST DOCD IN RCRD: CPT | Performed by: INTERNAL MEDICINE

## 2024-04-24 PROCEDURE — 99214 OFFICE O/P EST MOD 30 MIN: CPT | Performed by: INTERNAL MEDICINE

## 2024-04-24 PROCEDURE — 1126F AMNT PAIN NOTED NONE PRSNT: CPT | Performed by: INTERNAL MEDICINE

## 2024-04-24 PROCEDURE — 3078F DIAST BP <80 MM HG: CPT | Performed by: INTERNAL MEDICINE

## 2024-04-24 PROCEDURE — 1036F TOBACCO NON-USER: CPT | Performed by: INTERNAL MEDICINE

## 2024-04-24 ASSESSMENT — PAIN SCALES - GENERAL: PAINLEVEL: 0-NO PAIN

## 2024-04-24 NOTE — PROGRESS NOTES
Subjective      Chief Complaint   Patient presents with    Follow-up        89-year-old male with a significant atherosclerotic disease history seen in January for cardiac evaluation. He has coronary disease status post 3 vessel bypass surgery 2002. He has significant carotid artery disease status post left carotid artery stenting, follows with [facility]. He has a history symptomatic PACs and premature ventricular contractions as well as atrial flutter for which he sees Dr. Davey. He had issues with dyspnea earlier this year. It sounds like he was clinically diagnosed with acutely decompensated heart failure. He was started on lasix. Serial CXRs show right pleural effusion with fluctuating size. He lost >20lbs on lasix, stopped taking in every day Sunday now takes PRN.          ROS     Past Medical History:   Diagnosis Date    AAA (abdominal aortic aneurysm) (CMS-[facility])     Atherosclerotic heart disease of native coronary artery without angina pectoris     Coronary artery disease without angina pectoris, unspecified vessel or lesion type, unspecified whether native or transplanted heart    Bilateral carotid artery stenosis     Bradycardia     Hyperlipidemia     Hypertension     Personal history of other diseases of the circulatory system     History of hypertension    Personal history of other malignant neoplasm of skin     History of malignant neoplasm of skin    Sensorineural hearing loss, bilateral 05/12/2022    Sensorineural hearing loss, bilateral        Past Surgical History:   Procedure Laterality Date    OTHER SURGICAL HISTORY  04/20/2021    Coronary artery bypass graft    OTHER SURGICAL HISTORY  04/20/2021    Excision of basal cell carcinoma    OTHER SURGICAL HISTORY  04/20/2021    Hernia repair    OTHER SURGICAL HISTORY  04/20/2021    Foot surgery    OTHER SURGICAL HISTORY  04/20/2021    Carotid artery catheterization        Social History     Socioeconomic History    Marital status:      Spouse name:  Not on file    Number of children: Not on file    Years of education: Not on file    Highest education level: Not on file   Occupational History    Not on file   Tobacco Use    Smoking status: Never    Smokeless tobacco: Never   Vaping Use    Vaping status: Never Used   Substance and Sexual Activity    Alcohol use: Never    Drug use: Never    Sexual activity: Not Currently   Other Topics Concern    Not on file   Social History Narrative    Not on file     Social Determinants of Health     Financial Resource Strain: Not on file   Food Insecurity: Not on file   Transportation Needs: Not on file   Physical Activity: Not on file   Stress: Not on file   Social Connections: Not on file   Intimate Partner Violence: Not on file   Housing Stability: Not on file        Family History   Problem Relation Name Age of Onset    Other (bleeding ulcers) Father      Heart disease Father      Other (celiac sprue) Sister      Alzheimer's disease Sister          OBJECTIVE:    Vitals:    04/24/24 1035   BP: 128/60   Pulse: (!) 40   SpO2: 95%        Vitals reviewed.   Constitutional:       Appearance: Normal and healthy appearance. Not in distress.   Pulmonary:      Effort: Pulmonary effort is normal.      Breath sounds: Normal breath sounds.   Cardiovascular:      Normal rate. Regular rhythm. Normal S1. Normal S2.       Murmurs: There is no murmur.      No gallop.  No click.   Pulses:     Intact distal pulses.   Edema:     Peripheral edema present.     Ankle: bilateral 1+ pitting edema of the ankle.     Feet: bilateral 1+ pitting edema of the feet.  Skin:     General: Skin is warm and dry.   Neurological:      General: No focal deficit present.          Lab Review:   Lab Results   Component Value Date     (H) 12/12/2023    K 5.0 12/12/2023     (H) 12/12/2023    CO2 24 12/12/2023    BUN 21 12/12/2023    CREATININE 1.10 12/12/2023    GLUCOSE 94 12/12/2023    CALCIUM 9.6 12/12/2023     Lab Results   Component Value Date    CHOL  132 (L) 04/21/2023    TRIG 80 04/21/2023    HDL 43 04/21/2023       Lab Results   Component Value Date    LDLCALC 73 04/21/2023        Bilateral carotid artery stenosis  Stable. Serial US with [facility]    Coronary arteriosclerosis  Continue statin    Typical atrial flutter (Multi)  I am going to stop his metoprolol. Continue Eliquis for AC    Acute on chronic diastolic heart failure (Multi)  Will check an echocardiogram. Continue lasix 20mg PO every day.     Bradycardia  Stopping BB

## 2024-04-25 ENCOUNTER — OFFICE VISIT (OUTPATIENT)
Dept: PRIMARY CARE | Facility: CLINIC | Age: 89
End: 2024-04-25
Payer: MEDICARE

## 2024-04-25 VITALS
HEART RATE: 55 BPM | HEIGHT: 68 IN | DIASTOLIC BLOOD PRESSURE: 74 MMHG | BODY MASS INDEX: 25.16 KG/M2 | SYSTOLIC BLOOD PRESSURE: 116 MMHG | TEMPERATURE: 97.7 F | RESPIRATION RATE: 16 BRPM | WEIGHT: 166 LBS

## 2024-04-25 DIAGNOSIS — I10 ESSENTIAL HYPERTENSION: ICD-10-CM

## 2024-04-25 DIAGNOSIS — D50.9 IRON DEFICIENCY ANEMIA, UNSPECIFIED IRON DEFICIENCY ANEMIA TYPE: ICD-10-CM

## 2024-04-25 DIAGNOSIS — Z00.00 ANNUAL PHYSICAL EXAM: Primary | ICD-10-CM

## 2024-04-25 DIAGNOSIS — E78.5 HYPERLIPIDEMIA, UNSPECIFIED HYPERLIPIDEMIA TYPE: ICD-10-CM

## 2024-04-25 LAB
ALBUMIN SERPL-MCNC: 4.4 G/DL (ref 3.5–5)
ALP BLD-CCNC: 110 U/L (ref 35–125)
ALT SERPL-CCNC: 29 U/L (ref 5–40)
ANION GAP SERPL CALC-SCNC: 16 MMOL/L
AST SERPL-CCNC: 38 U/L (ref 5–40)
BASOPHILS # BLD AUTO: 0.06 X10*3/UL (ref 0–0.1)
BASOPHILS NFR BLD AUTO: 1.3 %
BILIRUB SERPL-MCNC: 1 MG/DL (ref 0.1–1.2)
BUN SERPL-MCNC: 28 MG/DL (ref 8–25)
CALCIUM SERPL-MCNC: 9.7 MG/DL (ref 8.5–10.4)
CHLORIDE SERPL-SCNC: 105 MMOL/L (ref 97–107)
CHOLEST SERPL-MCNC: 96 MG/DL (ref 133–200)
CHOLEST/HDLC SERPL: 2.4 {RATIO}
CO2 SERPL-SCNC: 25 MMOL/L (ref 24–31)
CREAT SERPL-MCNC: 1 MG/DL (ref 0.4–1.6)
EGFRCR SERPLBLD CKD-EPI 2021: 73 ML/MIN/1.73M*2
EOSINOPHIL # BLD AUTO: 0.36 X10*3/UL (ref 0–0.4)
EOSINOPHIL NFR BLD AUTO: 8.1 %
ERYTHROCYTE [DISTWIDTH] IN BLOOD BY AUTOMATED COUNT: 13.2 % (ref 11.5–14.5)
GLUCOSE SERPL-MCNC: 99 MG/DL (ref 65–99)
HCT VFR BLD AUTO: 40.1 % (ref 41–52)
HDLC SERPL-MCNC: 40 MG/DL
HGB BLD-MCNC: 12.7 G/DL (ref 13.5–17.5)
IMM GRANULOCYTES # BLD AUTO: 0.01 X10*3/UL (ref 0–0.5)
IMM GRANULOCYTES NFR BLD AUTO: 0.2 % (ref 0–0.9)
LDLC SERPL CALC-MCNC: 47 MG/DL (ref 65–130)
LYMPHOCYTES # BLD AUTO: 1.09 X10*3/UL (ref 0.8–3)
LYMPHOCYTES NFR BLD AUTO: 24.4 %
MCH RBC QN AUTO: 31.9 PG (ref 26–34)
MCHC RBC AUTO-ENTMCNC: 31.7 G/DL (ref 32–36)
MCV RBC AUTO: 101 FL (ref 80–100)
MONOCYTES # BLD AUTO: 0.58 X10*3/UL (ref 0.05–0.8)
MONOCYTES NFR BLD AUTO: 13 %
NEUTROPHILS # BLD AUTO: 2.36 X10*3/UL (ref 1.6–5.5)
NEUTROPHILS NFR BLD AUTO: 53 %
NRBC BLD-RTO: 0 /100 WBCS (ref 0–0)
PLATELET # BLD AUTO: 132 X10*3/UL (ref 150–450)
POTASSIUM SERPL-SCNC: 4.1 MMOL/L (ref 3.4–5.1)
PROT SERPL-MCNC: 7.4 G/DL (ref 5.9–7.9)
RBC # BLD AUTO: 3.98 X10*6/UL (ref 4.5–5.9)
SODIUM SERPL-SCNC: 146 MMOL/L (ref 133–145)
TRIGL SERPL-MCNC: 46 MG/DL (ref 40–150)
TSH SERPL DL<=0.05 MIU/L-ACNC: 3.73 MIU/L (ref 0.27–4.2)
WBC # BLD AUTO: 4.5 X10*3/UL (ref 4.4–11.3)

## 2024-04-25 PROCEDURE — 83540 ASSAY OF IRON: CPT | Performed by: FAMILY MEDICINE

## 2024-04-25 PROCEDURE — 80061 LIPID PANEL: CPT | Performed by: FAMILY MEDICINE

## 2024-04-25 PROCEDURE — 84075 ASSAY ALKALINE PHOSPHATASE: CPT | Performed by: FAMILY MEDICINE

## 2024-04-25 PROCEDURE — 1157F ADVNC CARE PLAN IN RCRD: CPT | Performed by: FAMILY MEDICINE

## 2024-04-25 PROCEDURE — 99215 OFFICE O/P EST HI 40 MIN: CPT | Mod: 25 | Performed by: FAMILY MEDICINE

## 2024-04-25 PROCEDURE — 36415 COLL VENOUS BLD VENIPUNCTURE: CPT | Performed by: FAMILY MEDICINE

## 2024-04-25 PROCEDURE — 99397 PER PM REEVAL EST PAT 65+ YR: CPT | Performed by: FAMILY MEDICINE

## 2024-04-25 PROCEDURE — 84443 ASSAY THYROID STIM HORMONE: CPT | Performed by: FAMILY MEDICINE

## 2024-04-25 PROCEDURE — 85025 COMPLETE CBC W/AUTO DIFF WBC: CPT | Performed by: FAMILY MEDICINE

## 2024-04-25 PROCEDURE — G0439 PPPS, SUBSEQ VISIT: HCPCS | Performed by: FAMILY MEDICINE

## 2024-04-25 PROCEDURE — 3074F SYST BP LT 130 MM HG: CPT | Performed by: FAMILY MEDICINE

## 2024-04-25 PROCEDURE — 1126F AMNT PAIN NOTED NONE PRSNT: CPT | Performed by: FAMILY MEDICINE

## 2024-04-25 PROCEDURE — 3078F DIAST BP <80 MM HG: CPT | Performed by: FAMILY MEDICINE

## 2024-04-25 PROCEDURE — 1036F TOBACCO NON-USER: CPT | Performed by: FAMILY MEDICINE

## 2024-04-25 PROCEDURE — 1160F RVW MEDS BY RX/DR IN RCRD: CPT | Performed by: FAMILY MEDICINE

## 2024-04-25 PROCEDURE — 1159F MED LIST DOCD IN RCRD: CPT | Performed by: FAMILY MEDICINE

## 2024-04-25 ASSESSMENT — PAIN SCALES - GENERAL: PAINLEVEL: 0-NO PAIN

## 2024-04-25 NOTE — PROGRESS NOTES
"Subjective   Patient ID: Jamie Caro is a 89 y.o. male who presents for Annual Exam (Pt here for physical).    HPI no new complaints    Review of Systems    Objective   /74   Pulse 55   Temp 36.5 °C (97.7 °F) (Temporal)   Resp 16   Ht 1.727 m (5' 8\")   Wt 75.3 kg (166 lb)   BMI 25.24 kg/m²     Physical Exam  Vitals and nursing note reviewed.   Constitutional:       General: He is not in acute distress.  HENT:      Right Ear: Tympanic membrane and ear canal normal.      Left Ear: Tympanic membrane and ear canal normal.      Nose: Nose normal. No rhinorrhea.      Mouth/Throat:      Pharynx: Oropharynx is clear. No oropharyngeal exudate or posterior oropharyngeal erythema.      Comments: Dentition wnl  Eyes:      Extraocular Movements: Extraocular movements intact.      Conjunctiva/sclera: Conjunctivae normal.      Pupils: Pupils are equal, round, and reactive to light.   Neck:      Vascular: No carotid bruit.   Cardiovascular:      Rate and Rhythm: Normal rate and regular rhythm.      Heart sounds: Normal heart sounds. No murmur heard.  Pulmonary:      Breath sounds: Normal breath sounds. No wheezing or rhonchi.   Abdominal:      General: Bowel sounds are normal. There is no distension.      Palpations: Abdomen is soft. There is no mass.      Tenderness: There is no abdominal tenderness. There is no guarding or rebound.      Hernia: No hernia is present.   Musculoskeletal:         General: No swelling or tenderness. Normal range of motion.      Cervical back: Normal range of motion and neck supple.   Lymphadenopathy:      Cervical: No cervical adenopathy.   Skin:     General: Skin is warm.      Findings: No rash.   Neurological:      General: No focal deficit present.      Mental Status: He is alert.       Assessment/Plan   Problem List Items Addressed This Visit             ICD-10-CM    Annual physical exam - Primary Z00.00          "

## 2024-04-26 ENCOUNTER — HOSPITAL ENCOUNTER (OUTPATIENT)
Dept: CARDIOLOGY | Facility: HOSPITAL | Age: 89
Discharge: HOME | End: 2024-04-26
Payer: MEDICARE

## 2024-04-26 DIAGNOSIS — D50.9 IRON DEFICIENCY ANEMIA, UNSPECIFIED IRON DEFICIENCY ANEMIA TYPE: ICD-10-CM

## 2024-04-26 DIAGNOSIS — I50.33 ACUTE ON CHRONIC DIASTOLIC HEART FAILURE (MULTI): ICD-10-CM

## 2024-04-26 LAB
AORTIC VALVE MEAN GRADIENT: 7 MMHG
AORTIC VALVE PEAK VELOCITY: 1.82 M/S
AV PEAK GRADIENT: 13.2 MMHG
AVA (PEAK VEL): 1.93 CM2
AVA (VTI): 1.99 CM2
EJECTION FRACTION APICAL 4 CHAMBER: 65.7
IRON SATN MFR SERPL: 34 % (ref 12–50)
IRON SERPL-MCNC: 75 UG/DL (ref 45–160)
LEFT ATRIUM VOLUME AREA LENGTH INDEX BSA: 42.1 ML/M2
LEFT VENTRICLE INTERNAL DIMENSION DIASTOLE: 4.5 CM (ref 3.5–6)
LEFT VENTRICULAR OUTFLOW TRACT DIAMETER: 2 CM
MITRAL VALVE E/A RATIO: 1.39
MITRAL VALVE E/E' RATIO: 23.33
RIGHT VENTRICLE FREE WALL PEAK S': 13.6 CM/S
RIGHT VENTRICLE PEAK SYSTOLIC PRESSURE: 35.5 MMHG
TIBC SERPL-MCNC: 221 UG/DL (ref 228–428)
TRICUSPID ANNULAR PLANE SYSTOLIC EXCURSION: 2.3 CM
UIBC SERPL-MCNC: 146 UG/DL (ref 110–370)

## 2024-04-26 PROCEDURE — 93306 TTE W/DOPPLER COMPLETE: CPT

## 2024-04-26 PROCEDURE — 93306 TTE W/DOPPLER COMPLETE: CPT | Performed by: INTERNAL MEDICINE

## 2024-05-06 ENCOUNTER — OFFICE VISIT (OUTPATIENT)
Dept: OTOLARYNGOLOGY | Facility: CLINIC | Age: 89
End: 2024-05-06
Payer: MEDICARE

## 2024-05-06 ENCOUNTER — CLINICAL SUPPORT (OUTPATIENT)
Dept: AUDIOLOGY | Facility: CLINIC | Age: 89
End: 2024-05-06
Payer: MEDICARE

## 2024-05-06 ENCOUNTER — PHARMACY VISIT (OUTPATIENT)
Dept: PHARMACY | Facility: CLINIC | Age: 89
End: 2024-05-06
Payer: COMMERCIAL

## 2024-05-06 VITALS — BODY MASS INDEX: 25.16 KG/M2 | HEIGHT: 68 IN | WEIGHT: 166 LBS

## 2024-05-06 DIAGNOSIS — H90.3 SENSORINEURAL HEARING LOSS (SNHL) OF BOTH EARS: Primary | ICD-10-CM

## 2024-05-06 DIAGNOSIS — J34.2 DEVIATED NASAL SEPTUM: ICD-10-CM

## 2024-05-06 DIAGNOSIS — J31.0 CHRONIC RHINITIS: Primary | ICD-10-CM

## 2024-05-06 PROCEDURE — 1036F TOBACCO NON-USER: CPT | Performed by: OTOLARYNGOLOGY

## 2024-05-06 PROCEDURE — 1157F ADVNC CARE PLAN IN RCRD: CPT | Performed by: OTOLARYNGOLOGY

## 2024-05-06 PROCEDURE — 99213 OFFICE O/P EST LOW 20 MIN: CPT | Performed by: OTOLARYNGOLOGY

## 2024-05-06 PROCEDURE — RXMED WILLOW AMBULATORY MEDICATION CHARGE

## 2024-05-06 PROCEDURE — 1160F RVW MEDS BY RX/DR IN RCRD: CPT | Performed by: OTOLARYNGOLOGY

## 2024-05-06 PROCEDURE — 1159F MED LIST DOCD IN RCRD: CPT | Performed by: OTOLARYNGOLOGY

## 2024-05-06 NOTE — PROGRESS NOTES
HPI  Jamie Caro is a 89 y.o. 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 He is not needing either spray right now and has them if he needs them. Audiogram was done 2022 and is stable in comparison to 1 year prior. No new ear symptoms. wearing anderson's bilat. checked today and doing well.  He is dealing with cough symptoms now believed to be from CHF with pleural effusion.  Scheduled to see Dr. Peterson soon.  He lost quite a bit of weight with furosemide.          Past Medical History:   Diagnosis Date    AAA (abdominal aortic aneurysm) (CMS-[facility])     Atherosclerotic heart disease of native coronary artery without angina pectoris     Coronary artery disease without angina pectoris, unspecified vessel or lesion type, unspecified whether native or transplanted heart    Bilateral carotid artery stenosis     Bradycardia     Hyperlipidemia     Hypertension     Personal history of other diseases of the circulatory system     History of hypertension    Personal history of other malignant neoplasm of skin     History of malignant neoplasm of skin    Sensorineural hearing loss, bilateral 05/12/2022    Sensorineural hearing loss, bilateral            Medications:     Current Outpatient Medications:     albuterol (Ventolin HFA) 90 mcg/actuation inhaler, Inhale 2 puffs every 4 hours if needed for wheezing or shortness of breath., Disp: 8 g, Rfl: 11    apixaban (Eliquis) 5 mg tablet, TAKE 1 TABLET BY MOUTH 2 TIMES A DAY WITH OR WITHOUT FOOD, Disp: 180 tablet, Rfl: 4    BENZONATATE ORAL, Tessalon Perles CAPS  Refills: 0     Active, Disp: , Rfl:     cholecalciferol (Vitamin D-3) 25 MCG (1000 UT) tablet, Take 1 tablet (25 mcg) by mouth once daily., Disp: , Rfl:     fluticasone (Flonase) 50 mcg/actuation nasal spray, Administer 2 pumps to each nostril once daily, Disp: 16 g, Rfl: 11    folic acid/multivit-min/lutein (CENTRUM SILVER ORAL), Take by mouth., Disp: , Rfl:     furosemide (Lasix) 20 mg  "tablet, Take 1 tablet (20 mg) by mouth once daily., Disp: 30 tablet, Rfl: 6    HYDROcodone-acetaminophen (Norco) 5-325 mg tablet, HYDROcodone-Acetaminophen 5-325 MG Oral Tablet  Refills: 0     Active, Disp: , Rfl:     ipratropium (Atrovent) 21 mcg (0.03 %) nasal spray, Ipratropium Bromide 0.03 % Nasal Solution  Refills: 0     Active, Disp: , Rfl:     ipratropium (Atrovent) 42 mcg (0.06 %) nasal spray, Administer 2 sprays into each nostril. 1-3x daily, Disp: , Rfl:     losartan (Cozaar) 25 mg tablet, Take 1 tablet (25 mg) by mouth once daily., Disp: 30 tablet, Rfl: 6    metroNIDAZOLE (Metrogel) 0.75 % gel, APPLY A PEA SIZED AMOUNT TOPICALLY TO FACE TWICE DAILY, Disp: , Rfl:     simvastatin (Zocor) 80 mg tablet, Take 1 tablet (80 mg) by mouth once daily., Disp: , Rfl:     triamcinolone (Kenalog) 0.1 % ointment, Apply topically 2 times a day., Disp: , Rfl:      Allergies:  Allergies   Allergen Reactions    Heparin Unknown    Terazosin Dizziness and Unknown        Physical Exam:  Last Recorded Vitals  Height 1.727 m (5' 8\"), weight 75.3 kg (166 lb).  General:     General appearance: Well-developed, well-nourished in no acute distress.       Voice:  normal       Head/face: Normal appearance; nontender to palpation     Facial nerve function: Normal and symmetric bilaterally.    Oral/oropharynx:     Oral vestibule: Normal labial and gingival mucosa     Tongue/floor of mouth: Normal without lesion     Oropharynx: Clear.  No lesions present of the hard/soft palate, posterior pharynx    Neck:     Neck: Normal appearance, trachea midline     Salivary glands: Normal to palpation bilaterally     Lymph nodes: No cervical lymphadenopathy to palpation     Thyroid: No thyromegaly.  No palpable nodules     Range of motion: Normal    Neurological:     Cortical functions: Alert and oriented x3, appropriate affect       Larynx/hypopharynx:     Laryngeal findings: Mirror exam inadequate or limited secondary to enlarged base of tongue " and/or excessive gagging    Ear:     Ear canal: Normal bilaterally     Tympanic membrane: Intact and mobile bilaterally     Pinna: Normal bilaterally     Hearing:  Gross hearing assessment normal by voice    Nose:     Visualized using: Anterior rhinoscopy     Nasopharynx: Inadequate mirror exam secondary to gag, anatomy.       Nasal dorsum: Nontraumatic midline appearance     Septum: Right deviation     Inferior turbinates: Normally sized     Mucosa: Bilateral, pink, normal appearing       ASSESSMENT/PLAN:  He has nasal sprays.  He is using hearing aids.  Has a plan for his cough and CHF.  Recheck 1 year, sooner as needed        Alex Bolaños MD

## 2024-05-06 NOTE — PROGRESS NOTES
HEARING AID CHECK    RIGHT:   RIGHT: PHONAK AUDEO B 90 R #2 LENGTH STANDARD  CLOSED MED DOME SN: [id number]  LEFT: PHONAK AUDEO B 90 R #2 LENGTH STANDARD  SMALL POWER DOME SN: [id number]  FIT DATE: 9/2018  WARRANTY: 12/21/21   today: 5/6/24   This patient was seen for a HAC : . Otoscopy showed clear canals, and patient wearing aids 8 hours per day and       patient was fine as did not want changes.  Discussed new     t technology if needing new aids .  Patient recently lost a great deal of weight but said it was purposeful.     The following programming changes were made: none today     The patient paid $30.00 for today's visit.  Return in 6 months or sooner if needed.      The following programming changes were made: none    The patient paid  for today's visit.  Return in 6 months or sooner if needed. Patient will be charged for a clean and check only.     APPOINTMENT TIME: 30 minutes.

## 2024-05-10 DIAGNOSIS — R79.0 ABNORMAL IRON SATURATION: ICD-10-CM

## 2024-06-06 ENCOUNTER — OFFICE VISIT (OUTPATIENT)
Dept: CARDIOLOGY | Facility: CLINIC | Age: 89
End: 2024-06-06
Payer: MEDICARE

## 2024-06-06 VITALS — DIASTOLIC BLOOD PRESSURE: 50 MMHG | HEART RATE: 46 BPM | SYSTOLIC BLOOD PRESSURE: 104 MMHG

## 2024-06-06 DIAGNOSIS — I48.3 TYPICAL ATRIAL FLUTTER (MULTI): Primary | ICD-10-CM

## 2024-06-06 PROCEDURE — 3078F DIAST BP <80 MM HG: CPT | Performed by: INTERNAL MEDICINE

## 2024-06-06 PROCEDURE — 93005 ELECTROCARDIOGRAM TRACING: CPT | Performed by: INTERNAL MEDICINE

## 2024-06-06 PROCEDURE — 3074F SYST BP LT 130 MM HG: CPT | Performed by: INTERNAL MEDICINE

## 2024-06-06 PROCEDURE — 93010 ELECTROCARDIOGRAM REPORT: CPT | Performed by: INTERNAL MEDICINE

## 2024-06-06 PROCEDURE — 1159F MED LIST DOCD IN RCRD: CPT | Performed by: INTERNAL MEDICINE

## 2024-06-06 PROCEDURE — 99214 OFFICE O/P EST MOD 30 MIN: CPT | Performed by: INTERNAL MEDICINE

## 2024-06-06 PROCEDURE — 1157F ADVNC CARE PLAN IN RCRD: CPT | Performed by: INTERNAL MEDICINE

## 2024-06-06 ASSESSMENT — COLUMBIA-SUICIDE SEVERITY RATING SCALE - C-SSRS
6. HAVE YOU EVER DONE ANYTHING, STARTED TO DO ANYTHING, OR PREPARED TO DO ANYTHING TO END YOUR LIFE?: NO
1. IN THE PAST MONTH, HAVE YOU WISHED YOU WERE DEAD OR WISHED YOU COULD GO TO SLEEP AND NOT WAKE UP?: NO
2. HAVE YOU ACTUALLY HAD ANY THOUGHTS OF KILLING YOURSELF?: NO

## 2024-06-06 NOTE — PROGRESS NOTES
Chief Complaint   Patient presents with    Atrial Flutter     6 month follow up            The patient is well-known to me.  He is an 89-year-old male with a history of hypertension, vascular disease and previously longstanding atrial fibrillation.  We took a rate control approach and anticoagulation and he presents to the office today for general follow-up.  Over the past several months he notes that he has been having more dyspnea with exertion but was placed on a diuretic and diuresed over 20 pounds over a very short duration.  He notes that he feels quite well now is able to take care of all his daily needs without much difficulty.  He has not had any need for cardioversion but remarkably he is in sinus rhythm today with significant bradycardia.  His Toprol was discontinued because of his bradycardia but he has had persistently  slow rates         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 (CMS-HCC) 09/02/2023    Bilateral carotid artery stenosis 09/02/2023    Bradycardia 09/02/2023    Cerebrovascular accident (Multi) 09/02/2023    Chronic rhinitis 09/02/2023    Coronary arteriosclerosis 09/02/2023    Essential hypertension 09/02/2023    Fluid volume disorder 09/02/2023    Hyperlipidemia 09/02/2023    Left inguinal pain 09/02/2023    Right inguinal pain 09/02/2023    Lumbago with sciatica 09/02/2023    Cognitive disorder 09/02/2023    Memory impairment 09/02/2023    Other chronic pain 09/02/2023    CVD (cardiovascular disease) 09/02/2023    Peripheral vascular disease (CMS-HCC) 09/02/2023    Pain in right hip 09/02/2023    Pain of left hip joint 09/02/2023    Premature atrial contraction 09/02/2023    Premature beats 09/02/2023    Right inguinal hernia 09/02/2023    Sensorineural hearing loss, 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    Sleep apnea 09/02/2023    Platelet disorder (Multi) 09/02/2023    Thrombocytopenia (CMS-HCC) 09/02/2023    Tinnitus of both ears  09/02/2023    Typical atrial flutter (Multi) 09/02/2023    Venous thrombosis 09/02/2023    Weakness of both lower extremities 09/02/2023    Dysuria 12/07/2023    Urinary tract infection with hematuria 12/07/2023    Pain in right hand 01/02/2024    Acute on chronic diastolic heart failure (Multi) 04/24/2024    Annual physical exam 04/25/2024     Resolved Ambulatory Problems     Diagnosis Date Noted    No Resolved Ambulatory Problems     Past Medical History:   Diagnosis Date    AAA (abdominal aortic aneurysm) (CMS-HCC)     Atherosclerotic heart disease of native coronary artery without angina pectoris     Hypertension     Personal history of other diseases of the circulatory system     Personal history of other malignant neoplasm of skin         Review of Systems   All other systems reviewed and are negative.       Objective     Vitals:    06/06/24 1146   BP: 104/50   Pulse: (!) 46        Vitals and nursing note reviewed.   Constitutional:       Appearance: Healthy appearance.   HENT:    Mouth/Throat:      Pharynx: Oropharynx is clear.   Pulmonary:      Effort: Pulmonary effort is normal.      Breath sounds: Normal breath sounds.   Cardiovascular:      PMI at left midclavicular line. Bradycardia present. Regular rhythm. Normal S1. Normal S2.       Murmurs: There is a grade 2/6 holosystolic murmur.      No gallop.  No click. No rub.   Pulses:     Intact distal pulses.   Edema:     Peripheral edema absent.   Abdominal:      General: Bowel sounds are normal.   Musculoskeletal:      Cervical back: Normal range of motion. Skin:     General: Skin is warm and dry.   Neurological:      General: No focal deficit present.      Mental Status: Alert and oriented to person, place and time.            Lab Review:   Hospital Outpatient Visit on 04/26/2024   Component Date Value    AV pk renny 04/26/2024 1.82     LVOT diam 04/26/2024 2.00     AV mn grad 04/26/2024 7.0     MV E/A ratio 04/26/2024 1.39     Tricuspid annular plane *  04/26/2024 2.3     LA vol index A/L 04/26/2024 42.1     MV avg E/e' ratio 04/26/2024 23.33     RV free wall pk S' 04/26/2024 13.60     RVSP 04/26/2024 35.5     LVIDd 04/26/2024 4.50     AV pk grad 04/26/2024 13.2     Aortic Valve Area by Con* 04/26/2024 1.99     Aortic Valve Area by Con* 04/26/2024 1.93     LV A4C EF 04/26/2024 65.7    Office Visit on 04/25/2024   Component Date Value    Thyroid Stimulating Horm* 04/25/2024 3.73     Cholesterol 04/25/2024 96 (L)     HDL-Cholesterol 04/25/2024 40.0 (L)     Cholesterol/HDL Ratio 04/25/2024 2.4     LDL Calculated 04/25/2024 47 (L)     Triglycerides 04/25/2024 46     Glucose 04/25/2024 99     Sodium 04/25/2024 146 (H)     Potassium 04/25/2024 4.1     Chloride 04/25/2024 105     Bicarbonate 04/25/2024 25     Urea Nitrogen 04/25/2024 28 (H)     Creatinine 04/25/2024 1.00     eGFR 04/25/2024 73     Calcium 04/25/2024 9.7     Albumin 04/25/2024 4.4     Alkaline Phosphatase 04/25/2024 110     Total Protein 04/25/2024 7.4     AST 04/25/2024 38     Bilirubin, Total 04/25/2024 1.0     ALT 04/25/2024 29     Anion Gap 04/25/2024 16     WBC 04/25/2024 4.5     nRBC 04/25/2024 0.0     RBC 04/25/2024 3.98 (L)     Hemoglobin 04/25/2024 12.7 (L)     Hematocrit 04/25/2024 40.1 (L)     MCV 04/25/2024 101 (H)     MCH 04/25/2024 31.9     MCHC 04/25/2024 31.7 (L)     RDW 04/25/2024 13.2     Platelets 04/25/2024 132 (L)     Neutrophils % 04/25/2024 53.0     Immature Granulocytes %,* 04/25/2024 0.2     Lymphocytes % 04/25/2024 24.4     Monocytes % 04/25/2024 13.0     Eosinophils % 04/25/2024 8.1     Basophils % 04/25/2024 1.3     Neutrophils Absolute 04/25/2024 2.36     Immature Granulocytes Ab* 04/25/2024 0.01     Lymphocytes Absolute 04/25/2024 1.09     Monocytes Absolute 04/25/2024 0.58     Eosinophils Absolute 04/25/2024 0.36     Basophils Absolute 04/25/2024 0.06     Iron 04/25/2024 75     UIBC 04/25/2024 146     TIBC 04/25/2024 221 (L)     % Saturation 04/25/2024 34    Office Visit on  04/09/2024   Component Date Value    BNP 04/09/2024 434 (H)        ECG:  Sinus bradycardia at 46 bpm with KY interval 220 ms and right bundle branch block and left anterior fascicular block with QRS duration of 160 ms    Assessment/plan:  History as noted above.  I am concerned regarding his conduction disease and significant bradycardia despite discontinuation of his beta-blockers.  He does have preserved LV systolic function and no significant valvular heart disease but would like to obtain a 2-week long-term Holter for further evaluation.    Problem List Items Addressed This Visit       Typical atrial flutter (Multi) - Primary    Relevant Orders    ECG 12 lead (Clinic Performed)

## 2024-06-06 NOTE — ASSESSMENT & PLAN NOTE
History as noted above.  I am concerned regarding his conduction disease and significant bradycardia despite discontinuation of his beta-blockers.  He does have preserved LV systolic function and no significant valvular heart disease but would like to obtain a 2-week long-term Holter for further evaluation.

## 2024-11-11 ENCOUNTER — APPOINTMENT (OUTPATIENT)
Dept: AUDIOLOGY | Facility: CLINIC | Age: 89
End: 2024-11-11
Payer: MEDICARE

## 2024-11-11 DIAGNOSIS — H90.3 SENSORINEURAL HEARING LOSS (SNHL) OF BOTH EARS: Primary | ICD-10-CM

## 2024-11-11 NOTE — PROGRESS NOTES
HEARING AID CHECK    RIGHT:PHONAK AUDEO B 90 R #2 LENGTH STANDARD  CLOSED MED DOME SN: [id number]  LEFT: PHONAK AUDEO B 90 R #2 LENGTH STANDARD  SMALL POWER DOME SN: [id number]  FIT DATE: 9/2018  WARRANTY: 12/21/21      TODAY: 11/11/24     This patient was seen for a HAC with NO  complaints and  that the aids are working well for him and he does not want any changes. I reminded patient that a hearing test is needed and  he will schedule today for this followed by a doctor visit and hearing aid check.   Otoscopy:  CLEAR      The following programming changes were made:    The patient paid  $65.00 for today's visit.  Return in 6 months or sooner if needed.    APPOINTMENT TIME: